# Patient Record
Sex: MALE | Race: WHITE | URBAN - METROPOLITAN AREA
[De-identification: names, ages, dates, MRNs, and addresses within clinical notes are randomized per-mention and may not be internally consistent; named-entity substitution may affect disease eponyms.]

---

## 2024-05-30 ENCOUNTER — APPOINTMENT (OUTPATIENT)
Dept: GENERAL RADIOLOGY | Age: 74
DRG: 378 | End: 2024-05-30
Attending: INTERNAL MEDICINE
Payer: MEDICARE

## 2024-05-30 ENCOUNTER — HOSPITAL ENCOUNTER (INPATIENT)
Age: 74
LOS: 6 days | Discharge: HOME | DRG: 378 | End: 2024-06-05
Attending: INTERNAL MEDICINE | Admitting: INTERNAL MEDICINE
Payer: MEDICARE

## 2024-05-30 PROBLEM — K92.2 GI BLEED: Status: ACTIVE | Noted: 2024-05-30

## 2024-05-30 LAB
ALBUMIN SERPL-MCNC: 3.2 G/DL (ref 3.5–5.2)
ALP SERPL-CCNC: 71 U/L (ref 40–129)
ALT SERPL-CCNC: 6 U/L (ref 0–40)
ANION GAP SERPL CALCULATED.3IONS-SCNC: 8 MMOL/L (ref 7–16)
AST SERPL-CCNC: 9 U/L (ref 0–39)
BILIRUB SERPL-MCNC: 0.5 MG/DL (ref 0–1.2)
BILIRUB UR QL STRIP: NEGATIVE
BUN SERPL-MCNC: 45 MG/DL (ref 6–23)
CALCIUM SERPL-MCNC: 8.4 MG/DL (ref 8.6–10.2)
CHLORIDE SERPL-SCNC: 111 MMOL/L (ref 98–107)
CLARITY UR: CLEAR
CO2 SERPL-SCNC: 25 MMOL/L (ref 22–29)
COLOR UR: YELLOW
COMMENT: NORMAL
CREAT SERPL-MCNC: 1 MG/DL (ref 0.7–1.2)
ERYTHROCYTE [DISTWIDTH] IN BLOOD BY AUTOMATED COUNT: 14.5 % (ref 11.5–15)
GFR, ESTIMATED: 76 ML/MIN/1.73M2
GLUCOSE SERPL-MCNC: 157 MG/DL (ref 74–99)
GLUCOSE UR STRIP-MCNC: NEGATIVE MG/DL
HCT VFR BLD AUTO: 24.6 % (ref 37–54)
HGB BLD-MCNC: 8 G/DL (ref 12.5–16.5)
HGB UR QL STRIP.AUTO: NEGATIVE
INR PPP: 1.3
KETONES UR STRIP-MCNC: NEGATIVE MG/DL
LEUKOCYTE ESTERASE UR QL STRIP: NEGATIVE
MAGNESIUM SERPL-MCNC: 2 MG/DL (ref 1.6–2.6)
MCH RBC QN AUTO: 28 PG (ref 26–35)
MCHC RBC AUTO-ENTMCNC: 32.5 G/DL (ref 32–34.5)
MCV RBC AUTO: 86 FL (ref 80–99.9)
NITRITE UR QL STRIP: NEGATIVE
PARTIAL THROMBOPLASTIN TIME: 23.4 SEC (ref 24.5–35.1)
PH UR STRIP: 5.5 [PH] (ref 5–9)
PHOSPHATE SERPL-MCNC: 3.1 MG/DL (ref 2.5–4.5)
PLATELET # BLD AUTO: 216 K/UL (ref 130–450)
PMV BLD AUTO: 10.5 FL (ref 7–12)
POTASSIUM SERPL-SCNC: 4.2 MMOL/L (ref 3.5–5)
PROT SERPL-MCNC: 5.5 G/DL (ref 6.4–8.3)
PROT UR STRIP-MCNC: NEGATIVE MG/DL
PROTHROMBIN TIME: 14.7 SEC (ref 9.3–12.4)
RBC # BLD AUTO: 2.86 M/UL (ref 3.8–5.8)
SODIUM SERPL-SCNC: 144 MMOL/L (ref 132–146)
SP GR UR STRIP: 1.02 (ref 1–1.03)
TROPONIN I SERPL HS-MCNC: 22 NG/L (ref 0–11)
UROBILINOGEN UR STRIP-ACNC: 0.2 EU/DL (ref 0–1)
WBC OTHER # BLD: 12.5 K/UL (ref 4.5–11.5)

## 2024-05-30 PROCEDURE — 6360000002 HC RX W HCPCS: Performed by: INTERNAL MEDICINE

## 2024-05-30 PROCEDURE — 2000000000 HC ICU R&B

## 2024-05-30 PROCEDURE — 83735 ASSAY OF MAGNESIUM: CPT

## 2024-05-30 PROCEDURE — 86850 RBC ANTIBODY SCREEN: CPT

## 2024-05-30 PROCEDURE — 85730 THROMBOPLASTIN TIME PARTIAL: CPT

## 2024-05-30 PROCEDURE — A4216 STERILE WATER/SALINE, 10 ML: HCPCS | Performed by: INTERNAL MEDICINE

## 2024-05-30 PROCEDURE — 71045 X-RAY EXAM CHEST 1 VIEW: CPT

## 2024-05-30 PROCEDURE — 86923 COMPATIBILITY TEST ELECTRIC: CPT

## 2024-05-30 PROCEDURE — 86901 BLOOD TYPING SEROLOGIC RH(D): CPT

## 2024-05-30 PROCEDURE — 2580000003 HC RX 258: Performed by: INTERNAL MEDICINE

## 2024-05-30 PROCEDURE — 80053 COMPREHEN METABOLIC PANEL: CPT

## 2024-05-30 PROCEDURE — 99291 CRITICAL CARE FIRST HOUR: CPT | Performed by: INTERNAL MEDICINE

## 2024-05-30 PROCEDURE — 85027 COMPLETE CBC AUTOMATED: CPT

## 2024-05-30 PROCEDURE — 84484 ASSAY OF TROPONIN QUANT: CPT

## 2024-05-30 PROCEDURE — 81003 URINALYSIS AUTO W/O SCOPE: CPT

## 2024-05-30 PROCEDURE — 84100 ASSAY OF PHOSPHORUS: CPT

## 2024-05-30 PROCEDURE — 86900 BLOOD TYPING SEROLOGIC ABO: CPT

## 2024-05-30 PROCEDURE — C9113 INJ PANTOPRAZOLE SODIUM, VIA: HCPCS | Performed by: INTERNAL MEDICINE

## 2024-05-30 PROCEDURE — 85610 PROTHROMBIN TIME: CPT

## 2024-05-30 RX ORDER — SERTRALINE HYDROCHLORIDE 25 MG/1
25 TABLET, FILM COATED ORAL DAILY
COMMUNITY

## 2024-05-30 RX ORDER — SODIUM CHLORIDE 0.9 % (FLUSH) 0.9 %
5-40 SYRINGE (ML) INJECTION PRN
Status: DISCONTINUED | OUTPATIENT
Start: 2024-05-30 | End: 2024-06-05 | Stop reason: HOSPADM

## 2024-05-30 RX ORDER — ONDANSETRON 4 MG/1
4 TABLET, ORALLY DISINTEGRATING ORAL EVERY 8 HOURS PRN
Status: DISCONTINUED | OUTPATIENT
Start: 2024-05-30 | End: 2024-06-05 | Stop reason: HOSPADM

## 2024-05-30 RX ORDER — SODIUM CHLORIDE, SODIUM LACTATE, POTASSIUM CHLORIDE, CALCIUM CHLORIDE 600; 310; 30; 20 MG/100ML; MG/100ML; MG/100ML; MG/100ML
INJECTION, SOLUTION INTRAVENOUS CONTINUOUS
Status: DISCONTINUED | OUTPATIENT
Start: 2024-05-30 | End: 2024-06-04

## 2024-05-30 RX ORDER — DONEPEZIL HYDROCHLORIDE 10 MG/1
10 TABLET, FILM COATED ORAL NIGHTLY
COMMUNITY

## 2024-05-30 RX ORDER — POTASSIUM CHLORIDE 7.45 MG/ML
10 INJECTION INTRAVENOUS PRN
Status: DISCONTINUED | OUTPATIENT
Start: 2024-05-30 | End: 2024-06-05 | Stop reason: HOSPADM

## 2024-05-30 RX ORDER — NALTREXONE HYDROCHLORIDE 50 MG/1
3 TABLET, FILM COATED ORAL DAILY
COMMUNITY

## 2024-05-30 RX ORDER — POLYETHYLENE GLYCOL 3350 17 G/17G
17 POWDER, FOR SOLUTION ORAL DAILY PRN
Status: DISCONTINUED | OUTPATIENT
Start: 2024-05-30 | End: 2024-06-05 | Stop reason: HOSPADM

## 2024-05-30 RX ORDER — ACETAMINOPHEN 325 MG/1
650 TABLET ORAL EVERY 6 HOURS PRN
Status: DISCONTINUED | OUTPATIENT
Start: 2024-05-30 | End: 2024-06-05 | Stop reason: HOSPADM

## 2024-05-30 RX ORDER — SODIUM CHLORIDE 9 MG/ML
INJECTION, SOLUTION INTRAVENOUS PRN
Status: DISCONTINUED | OUTPATIENT
Start: 2024-05-30 | End: 2024-06-05 | Stop reason: HOSPADM

## 2024-05-30 RX ORDER — POTASSIUM CHLORIDE 29.8 MG/ML
20 INJECTION INTRAVENOUS PRN
Status: DISCONTINUED | OUTPATIENT
Start: 2024-05-30 | End: 2024-06-05 | Stop reason: HOSPADM

## 2024-05-30 RX ORDER — SODIUM CHLORIDE 0.9 % (FLUSH) 0.9 %
5-40 SYRINGE (ML) INJECTION EVERY 12 HOURS SCHEDULED
Status: DISCONTINUED | OUTPATIENT
Start: 2024-05-30 | End: 2024-06-05 | Stop reason: HOSPADM

## 2024-05-30 RX ORDER — ACETAMINOPHEN 650 MG/1
650 SUPPOSITORY RECTAL EVERY 6 HOURS PRN
Status: DISCONTINUED | OUTPATIENT
Start: 2024-05-30 | End: 2024-06-05 | Stop reason: HOSPADM

## 2024-05-30 RX ORDER — MAGNESIUM SULFATE IN WATER 40 MG/ML
2000 INJECTION, SOLUTION INTRAVENOUS PRN
Status: DISCONTINUED | OUTPATIENT
Start: 2024-05-30 | End: 2024-06-05 | Stop reason: HOSPADM

## 2024-05-30 RX ORDER — ONDANSETRON 2 MG/ML
4 INJECTION INTRAMUSCULAR; INTRAVENOUS EVERY 6 HOURS PRN
Status: DISCONTINUED | OUTPATIENT
Start: 2024-05-30 | End: 2024-06-05 | Stop reason: HOSPADM

## 2024-05-30 RX ADMIN — SODIUM CHLORIDE, PRESERVATIVE FREE 40 MG: 5 INJECTION INTRAVENOUS at 11:36

## 2024-05-30 RX ADMIN — SODIUM CHLORIDE, POTASSIUM CHLORIDE, SODIUM LACTATE AND CALCIUM CHLORIDE: 600; 310; 30; 20 INJECTION, SOLUTION INTRAVENOUS at 21:40

## 2024-05-30 RX ADMIN — SODIUM CHLORIDE, POTASSIUM CHLORIDE, SODIUM LACTATE AND CALCIUM CHLORIDE: 600; 310; 30; 20 INJECTION, SOLUTION INTRAVENOUS at 11:40

## 2024-05-30 RX ADMIN — SODIUM CHLORIDE, PRESERVATIVE FREE 40 MG: 5 INJECTION INTRAVENOUS at 22:10

## 2024-05-30 RX ADMIN — SODIUM CHLORIDE, PRESERVATIVE FREE 10 ML: 5 INJECTION INTRAVENOUS at 11:41

## 2024-05-30 ASSESSMENT — PAIN SCALES - GENERAL: PAINLEVEL_OUTOF10: 0

## 2024-05-30 NOTE — CARE COORDINATION
Case Management Assessment  Initial Evaluation    Date/Time of Evaluation: 5/30/2024 3:31 PM  Assessment Completed by: Sharita Biswas RN    If patient is discharged prior to next notation, then this note serves as note for discharge by case management.    Patient Name: Faisal Pathak                   YOB: 1950  Diagnosis: GI bleed [K92.2]                   Date / Time: 5/30/2024 10:51 AM    Patient Admission Status: Inpatient   Readmission Risk (Low < 19, Mod (19-27), High > 27): Readmission Risk Score: 11.5    Current PCP: Joshua Vitale MD  PCP verified by CM? Yes    Chart Reviewed: Yes      History Provided by: Other (see comment) (sister in law)  Patient Orientation: Alert and Oriented, Other (see comment) (pt has Alzheimers , sister in law at bedside)    Patient Cognition: Dementia / Early Alzheimer's    Hospitalization in the last 30 days (Readmission):  No    If yes, Readmission Assessment in  Navigator will be completed.    Advance Directives:      Code Status: Full Code   Patient's Primary Decision Maker is: Legal Next of Kin    Primary Decision Maker: Daphne Norton - Child - [phone]    Primary Decision Maker: PathakAdama  Child - [phone]    Discharge Planning:    Patient lives with: Family Members, Children Type of Home: House  Primary Care Giver: Family  Patient Support Systems include: Children, Family Members, Friends/Neighbors     ADLS  Prior functional level: Assistance with the following:, Mobility, Cooking, Housework, Shopping  Current functional level: Assistance with the following:, Cooking, Housework, Shopping, Mobility      Family can provide assistance at DC: Yes  Would you like Case Management to discuss the discharge plan with any other family members/significant others, and if so, who? Yes (yes, Esperanza (ok per dtr Kelsey ))  Plans to Return to Present Housing: Yes  Other Identified Issues/Barriers to RETURNING to current housing: none

## 2024-05-30 NOTE — PROGRESS NOTES
4 Eyes Skin Assessment     NAME:  Faisal Pathak  YOB: 1950  MEDICAL RECORD NUMBER:  [id number]    The patient is being assessed for  Admission    I agree that at least one RN has performed a thorough Head to Toe Skin Assessment on the patient. ALL assessment sites listed below have been assessed.      Areas assessed by both nurses:    Head, Face, Ears, Shoulders, Back, Chest, Arms, Elbows, Hands, Sacrum. Buttock, Coccyx, Ischium, Legs. Feet and Heels, and Under Medical Devices     Sacrum/coccyx red and blanchable, heels red and flaky        Does the Patient have a Wound? No noted wound(s)       David Prevention initiated by RN: Yes  Wound Care Orders initiated by RN: No    Pressure Injury (Stage 3,4, Unstageable, DTI, NWPT, and Complex wounds) if present, place Wound referral order by RN under : No    New Ostomies, if present place, Ostomy referral order under : No     Nurse 1 eSignature: Electronically signed by Harriet Roth RN on 5/30/24 at 5:32 PM EDT    **SHARE this note so that the co-signing nurse can place an eSignature**    Nurse 2 eSignature: Electronically signed by Joshua Haider RN on 5/30/24 at 5:33 PM EDT

## 2024-05-30 NOTE — H&P
Dictate [id number]    Faisal was counseled on smoking cessation. Faisal smokes approximately 5-10 cigarettes per day x 40 years. We have had extensive discussion regarding the need to quit smoking, the negative health implications of smoking overall, as well as the impact on Faisal's comorbid conditions. Faisal does not voice a willingness to quit smoking and we have not set a quit date.  We have discussed potential methods for smoking cessation including nicotine replacement via patch, gum or lozenge, behavioral and lifestyle modifications, and follow-up with primary care provider for consideration of medications for smoking cessation as well.  We have also discussed additional resources such as CDC site for additional information, quitassist.com, and Quitline [place].  We have discussed the arrangement of follow-up with primary care provider to discuss progress as well as the potential institution of medications if required/desired.  The amount of time spent counseling the patient directly regarding smoking cessation is greater than 10 minutes.

## 2024-05-30 NOTE — ACP (ADVANCE CARE PLANNING)
Advance Care Planning   Healthcare Decision Maker:    Primary Decision Maker: Daphne Norton - Child - [phone]    Primary Decision Maker: Adama Pathak - Child - [phone]    Today we documented Decision Maker(s) consistent with Legal Next of Kin hierarchy.

## 2024-05-30 NOTE — CONSULTS
Assessment and Plan  Patient is a 73 y.o. male with the following medical Problems:   Upper GI bleeding  Epistaxis  Acute blood loss anemia  Prior history of upper GI bleeding 3 years ago.  History of duodenal ulcer.  Chronic atrial fibrillation on Eliquis  Heart failure with preserved ejection fraction  Coronary artery disease (status post CABG)  Spinal stenosis    Plan of care:  Basic labs including CBC, CMP, magnesium, phosphate, and INR.  Type and screen.  Transfuse to keep hemoglobin above 7.  Close monitoring of blood pressure.  GI consult.  Protonix twice a day.  Maintenance IV fluid.  N.p.o. while waiting for workup.    History of Present Illness:    70 yo male with Hx of Afib (on coumadin), CHF (EF 55-60%, 8/2019), CAD sp CABG, spinal stenosis with prior laminectomies and prior C6 corpectomy who presented with Right hand weakness, myelopathy. MRI showed significant cord compression; taken to OR on 9/13 and had a C3-7 laminectomy, C2-T1 instrumented fusion.   Patient was admitted to [facility] with upper GI bleeding however GI services were not available and patient was transferred to [facility] for management.    Past Medical History:  Past Medical History:   Diagnosis Date    Bleeding tendency (HCC)     Hand pain     Heart attack (HCC) 2004    Numbness       Past Surgical History:   Procedure Laterality Date    CERVICAL FUSION  2003    Dr. Joseph / [place]     CERVICAL FUSION  09/2019    multilevel posterior @ [facility]    CORONARY ARTERY BYPASS GRAFT  2004       Family History:   Family History   Problem Relation Age of Onset    Cancer Mother     Cancer Father     Heart Attack Father         Allergies:         Patient has no known allergies.    Social history:  Social History     Socioeconomic History    Marital status:      Spouse name: Not on file    Number of children: Not on file    Years of education: Not on file    Highest education level: Not on file  decision-making.  Time devoted to teaching and to any procedures I billed separately is not included.  Critical Care Time: 31 minutes      Electronically signed by Sesar Huffman MD on 5/30/2024 at 11:19 AM

## 2024-05-30 NOTE — PROGRESS NOTES
4 Eyes Skin Assessment     NAME:  Faisal Pathak  YOB: 1950  MEDICAL RECORD NUMBER:  [id number]    The patient is being assessed for  Admission    I agree that at least one RN has performed a thorough Head to Toe Skin Assessment on the patient. ALL assessment sites listed below have been assessed.      Areas assessed by both nurses:    Head, Face, Ears, Shoulders, Back, Chest, Arms, Elbows, Hands, Sacrum. Buttock, Coccyx, Ischium, Legs. Feet and Heels, and Under Medical Devices     Blanchable redness to gluteal crease/coccyx area, mepilex applied.  Heels red and dry.  Scattered bruising to BUE from frequent venipunctures.        Does the Patient have a Wound? No noted wound(s)       David Prevention initiated by RN: Yes  Wound Care Orders initiated by RN: No    Pressure Injury (Stage 3,4, Unstageable, DTI, NWPT, and Complex wounds) if present, place Wound referral order by RN under : No    New Ostomies, if present place, Ostomy referral order under : No     Nurse 1 eSignature: Electronically signed by Harriet Roth RN on 5/30/24 at 3:31 PM EDT    **SHARE this note so that the co-signing nurse can place an eSignature**    Nurse 2 eSignature: Electronically signed by Jada Lal RN on 5/30/24 at 3:33 PM EDT

## 2024-05-30 NOTE — PLAN OF CARE
Problem: Skin/Tissue Integrity  Goal: Absence of new skin breakdown  Description: 1.  Monitor for areas of redness and/or skin breakdown  2.  Assess vascular access sites hourly  3.  Every 4-6 hours minimum:  Change oxygen saturation probe site  4.  Every 4-6 hours:  If on nasal continuous positive airway pressure, respiratory therapy assess nares and determine need for appliance change or resting period.  Outcome: Not Progressing     Problem: Discharge Planning  Goal: Discharge to home or other facility with appropriate resources  Outcome: Progressing     Problem: ABCDS Injury Assessment  Goal: Absence of physical injury  Outcome: Progressing     Problem: Safety - Adult  Goal: Free from fall injury  Outcome: Progressing     Problem: Skin/Tissue Integrity  Goal: Absence of new skin breakdown  Description: 1.  Monitor for areas of redness and/or skin breakdown  2.  Assess vascular access sites hourly  3.  Every 4-6 hours minimum:  Change oxygen saturation probe site  4.  Every 4-6 hours:  If on nasal continuous positive airway pressure, respiratory therapy assess nares and determine need for appliance change or resting period.  Outcome: Not Progressing

## 2024-05-30 NOTE — H&P
[address] [address]                           HISTORY & PHYSICAL      PATIENT NAME: MIGEL RICHARDS            : 1950  MED REC NO: [id number]                        ROOM: [facility]  ACCOUNT NO: [id number]                       ADMIT DATE: 2024  PROVIDER: Mustapha Sims DO      REFERRING PHYSICIAN:  BERT LIMA    CHIEF COMPLAINT AND HISTORY OF CHIEF COMPLAINT:  This is a pleasant 73-year-old white male, who was transferred from [facility].  The patient normally sees his primary care doctor, Dr. Blaine Vitale.  The patient apparently presented to [facility] with what appeared to be epistaxis.  The patient had been on anticoagulant therapy because of paroxysmal atrial fibrillation.  The patient apparently developed epistaxis a day ago, which got progressively worse.  This has been uncontrolled bleeding, at which time he presented to the emergency room at [facility].  It was noted at that time the patient had significant amount of bleeding.  A Rhino rocket was placed into the left naris for the epistaxis.  It was also noted at that time the patient did appear to be hemodynamically stable with melena, melenic stools.  Because of the possibility of superimposed GI bleed without an ENT or GI specialist at [facility], the patient was transferred to the intensive coronary care unit here.  I was contacted by the access line.  At that time, in the emergency room, it was uncertain of his hemodynamic stable and he was admitted to the hospital here.  The patient arrived here by ambulance.  He appeared to be hemodynamically stable.  The patient had had consultation obtained with Critical Care and we reviewed this case.  We have seen the patient upon arrival.  From a cardiac standpoint of view, the patient does follow with Dr. St, which is Cardiology.  He does

## 2024-05-31 ENCOUNTER — ANESTHESIA (OUTPATIENT)
Dept: ENDOSCOPY | Age: 74
End: 2024-05-31
Payer: MEDICARE

## 2024-05-31 ENCOUNTER — ANESTHESIA EVENT (OUTPATIENT)
Dept: ENDOSCOPY | Age: 74
End: 2024-05-31
Payer: MEDICARE

## 2024-05-31 LAB
25(OH)D3 SERPL-MCNC: <6 NG/ML (ref 30–100)
ALBUMIN SERPL-MCNC: 3.1 G/DL (ref 3.5–5.2)
ALP SERPL-CCNC: 65 U/L (ref 40–129)
ALT SERPL-CCNC: 5 U/L (ref 0–40)
ANION GAP SERPL CALCULATED.3IONS-SCNC: 9 MMOL/L (ref 7–16)
AST SERPL-CCNC: 11 U/L (ref 0–39)
BASOPHILS # BLD: 0.04 K/UL (ref 0–0.2)
BASOPHILS NFR BLD: 0 % (ref 0–2)
BILIRUB SERPL-MCNC: 0.5 MG/DL (ref 0–1.2)
BUN SERPL-MCNC: 30 MG/DL (ref 6–23)
CALCIUM SERPL-MCNC: 8.4 MG/DL (ref 8.6–10.2)
CHLORIDE SERPL-SCNC: 108 MMOL/L (ref 98–107)
CHOLEST SERPL-MCNC: 107 MG/DL
CO2 SERPL-SCNC: 25 MMOL/L (ref 22–29)
CREAT SERPL-MCNC: 0.9 MG/DL (ref 0.7–1.2)
EOSINOPHIL # BLD: 0.07 K/UL (ref 0.05–0.5)
EOSINOPHILS RELATIVE PERCENT: 1 % (ref 0–6)
ERYTHROCYTE [DISTWIDTH] IN BLOOD BY AUTOMATED COUNT: 14.6 % (ref 11.5–15)
FOLATE SERPL-MCNC: 7.1 NG/ML (ref 4.8–24.2)
GFR, ESTIMATED: 87 ML/MIN/1.73M2
GLUCOSE SERPL-MCNC: 111 MG/DL (ref 74–99)
HCT VFR BLD AUTO: 20.2 % (ref 37–54)
HCT VFR BLD AUTO: 22.5 % (ref 37–54)
HCT VFR BLD AUTO: 22.6 % (ref 37–54)
HDLC SERPL-MCNC: 28 MG/DL
HGB BLD-MCNC: 6.6 G/DL (ref 12.5–16.5)
HGB BLD-MCNC: 7.3 G/DL (ref 12.5–16.5)
HGB BLD-MCNC: 7.4 G/DL (ref 12.5–16.5)
IMM GRANULOCYTES # BLD AUTO: 0.05 K/UL (ref 0–0.58)
IMM GRANULOCYTES NFR BLD: 0 % (ref 0–5)
INR PPP: 1.3
IRON SATN MFR SERPL: 8 % (ref 20–55)
IRON SERPL-MCNC: 21 UG/DL (ref 59–158)
LDLC SERPL CALC-MCNC: 54 MG/DL
LYMPHOCYTES NFR BLD: 2.9 K/UL (ref 1.5–4)
LYMPHOCYTES RELATIVE PERCENT: 23 % (ref 20–42)
MAGNESIUM SERPL-MCNC: 2 MG/DL (ref 1.6–2.6)
MCH RBC QN AUTO: 28.4 PG (ref 26–35)
MCHC RBC AUTO-ENTMCNC: 32.3 G/DL (ref 32–34.5)
MCV RBC AUTO: 87.9 FL (ref 80–99.9)
MONOCYTES NFR BLD: 1.02 K/UL (ref 0.1–0.95)
MONOCYTES NFR BLD: 8 % (ref 2–12)
NEUTROPHILS NFR BLD: 67 % (ref 43–80)
NEUTS SEG NFR BLD: 8.34 K/UL (ref 1.8–7.3)
PARTIAL THROMBOPLASTIN TIME: 21.8 SEC (ref 24.5–35.1)
PHOSPHATE SERPL-MCNC: 2.4 MG/DL (ref 2.5–4.5)
PLATELET # BLD AUTO: 203 K/UL (ref 130–450)
PMV BLD AUTO: 10.6 FL (ref 7–12)
POTASSIUM SERPL-SCNC: 3.9 MMOL/L (ref 3.5–5)
PROT SERPL-MCNC: 5.6 G/DL (ref 6.4–8.3)
PROTHROMBIN TIME: 14.2 SEC (ref 9.3–12.4)
RBC # BLD AUTO: 2.57 M/UL (ref 3.8–5.8)
SODIUM SERPL-SCNC: 142 MMOL/L (ref 132–146)
T4 FREE SERPL-MCNC: 1.3 NG/DL (ref 0.9–1.7)
TIBC SERPL-MCNC: 271 UG/DL (ref 250–450)
TRIGL SERPL-MCNC: 125 MG/DL
TROPONIN I SERPL HS-MCNC: 22 NG/L (ref 0–11)
TSH SERPL DL<=0.05 MIU/L-ACNC: 1.91 UIU/ML (ref 0.27–4.2)
VIT B12 SERPL-MCNC: 195 PG/ML (ref 211–946)
VLDLC SERPL CALC-MCNC: 25 MG/DL
WBC OTHER # BLD: 12.4 K/UL (ref 4.5–11.5)

## 2024-05-31 PROCEDURE — 6360000002 HC RX W HCPCS

## 2024-05-31 PROCEDURE — 0DJ08ZZ INSPECTION OF UPPER INTESTINAL TRACT, VIA NATURAL OR ARTIFICIAL OPENING ENDOSCOPIC: ICD-10-PCS | Performed by: INTERNAL MEDICINE

## 2024-05-31 PROCEDURE — 82607 VITAMIN B-12: CPT

## 2024-05-31 PROCEDURE — 84439 ASSAY OF FREE THYROXINE: CPT

## 2024-05-31 PROCEDURE — 6360000002 HC RX W HCPCS: Performed by: NURSE ANESTHETIST, CERTIFIED REGISTERED

## 2024-05-31 PROCEDURE — 84443 ASSAY THYROID STIM HORMONE: CPT

## 2024-05-31 PROCEDURE — 85018 HEMOGLOBIN: CPT

## 2024-05-31 PROCEDURE — 80053 COMPREHEN METABOLIC PANEL: CPT

## 2024-05-31 PROCEDURE — 82306 VITAMIN D 25 HYDROXY: CPT

## 2024-05-31 PROCEDURE — 99291 CRITICAL CARE FIRST HOUR: CPT | Performed by: INTERNAL MEDICINE

## 2024-05-31 PROCEDURE — 84100 ASSAY OF PHOSPHORUS: CPT

## 2024-05-31 PROCEDURE — 83735 ASSAY OF MAGNESIUM: CPT

## 2024-05-31 PROCEDURE — 36415 COLL VENOUS BLD VENIPUNCTURE: CPT

## 2024-05-31 PROCEDURE — 36592 COLLECT BLOOD FROM PICC: CPT

## 2024-05-31 PROCEDURE — 2580000003 HC RX 258

## 2024-05-31 PROCEDURE — 85025 COMPLETE CBC W/AUTO DIFF WBC: CPT

## 2024-05-31 PROCEDURE — P9016 RBC LEUKOCYTES REDUCED: HCPCS

## 2024-05-31 PROCEDURE — 2500000003 HC RX 250 WO HCPCS: Performed by: INTERNAL MEDICINE

## 2024-05-31 PROCEDURE — 6360000002 HC RX W HCPCS: Performed by: INTERNAL MEDICINE

## 2024-05-31 PROCEDURE — 6370000000 HC RX 637 (ALT 250 FOR IP)

## 2024-05-31 PROCEDURE — 36430 TRANSFUSION BLD/BLD COMPNT: CPT

## 2024-05-31 PROCEDURE — 2580000003 HC RX 258: Performed by: INTERNAL MEDICINE

## 2024-05-31 PROCEDURE — 84484 ASSAY OF TROPONIN QUANT: CPT

## 2024-05-31 PROCEDURE — C9113 INJ PANTOPRAZOLE SODIUM, VIA: HCPCS | Performed by: INTERNAL MEDICINE

## 2024-05-31 PROCEDURE — 7100000001 HC PACU RECOVERY - ADDTL 15 MIN

## 2024-05-31 PROCEDURE — 3700000001 HC ADD 15 MINUTES (ANESTHESIA): Performed by: INTERNAL MEDICINE

## 2024-05-31 PROCEDURE — 83540 ASSAY OF IRON: CPT

## 2024-05-31 PROCEDURE — 80061 LIPID PANEL: CPT

## 2024-05-31 PROCEDURE — 82746 ASSAY OF FOLIC ACID SERUM: CPT

## 2024-05-31 PROCEDURE — 93005 ELECTROCARDIOGRAM TRACING: CPT

## 2024-05-31 PROCEDURE — A4216 STERILE WATER/SALINE, 10 ML: HCPCS | Performed by: INTERNAL MEDICINE

## 2024-05-31 PROCEDURE — 2709999900 HC NON-CHARGEABLE SUPPLY: Performed by: INTERNAL MEDICINE

## 2024-05-31 PROCEDURE — 2000000000 HC ICU R&B

## 2024-05-31 PROCEDURE — 85610 PROTHROMBIN TIME: CPT

## 2024-05-31 PROCEDURE — 85014 HEMATOCRIT: CPT

## 2024-05-31 PROCEDURE — 2580000003 HC RX 258: Performed by: NURSE ANESTHETIST, CERTIFIED REGISTERED

## 2024-05-31 PROCEDURE — 85730 THROMBOPLASTIN TIME PARTIAL: CPT

## 2024-05-31 PROCEDURE — 3609017100 HC EGD: Performed by: INTERNAL MEDICINE

## 2024-05-31 PROCEDURE — 83550 IRON BINDING TEST: CPT

## 2024-05-31 PROCEDURE — 3700000000 HC ANESTHESIA ATTENDED CARE: Performed by: INTERNAL MEDICINE

## 2024-05-31 RX ORDER — DEXMEDETOMIDINE HYDROCHLORIDE 4 UG/ML
.1-1.5 INJECTION, SOLUTION INTRAVENOUS CONTINUOUS
Status: DISCONTINUED | OUTPATIENT
Start: 2024-05-31 | End: 2024-06-02

## 2024-05-31 RX ORDER — SODIUM CHLORIDE, SODIUM LACTATE, POTASSIUM CHLORIDE, CALCIUM CHLORIDE 600; 310; 30; 20 MG/100ML; MG/100ML; MG/100ML; MG/100ML
INJECTION, SOLUTION INTRAVENOUS CONTINUOUS PRN
Status: DISCONTINUED | OUTPATIENT
Start: 2024-05-31 | End: 2024-05-31 | Stop reason: SDUPTHER

## 2024-05-31 RX ORDER — SODIUM CHLORIDE 9 MG/ML
INJECTION, SOLUTION INTRAVENOUS PRN
Status: DISCONTINUED | OUTPATIENT
Start: 2024-05-31 | End: 2024-06-01

## 2024-05-31 RX ORDER — CYANOCOBALAMIN 1000 UG/ML
1000 INJECTION, SOLUTION INTRAMUSCULAR; SUBCUTANEOUS DAILY
Status: DISCONTINUED | OUTPATIENT
Start: 2024-05-31 | End: 2024-06-01

## 2024-05-31 RX ORDER — ERGOCALCIFEROL 1.25 MG/1
50000 CAPSULE ORAL WEEKLY
Status: DISCONTINUED | OUTPATIENT
Start: 2024-05-31 | End: 2024-06-05 | Stop reason: HOSPADM

## 2024-05-31 RX ORDER — POTASSIUM CHLORIDE 7.45 MG/ML
10 INJECTION INTRAVENOUS
Status: COMPLETED | OUTPATIENT
Start: 2024-05-31 | End: 2024-05-31

## 2024-05-31 RX ORDER — PROPOFOL 10 MG/ML
INJECTION, EMULSION INTRAVENOUS PRN
Status: DISCONTINUED | OUTPATIENT
Start: 2024-05-31 | End: 2024-05-31 | Stop reason: SDUPTHER

## 2024-05-31 RX ADMIN — PROPOFOL 50 MG: 10 INJECTION, EMULSION INTRAVENOUS at 17:00

## 2024-05-31 RX ADMIN — SODIUM CHLORIDE, PRESERVATIVE FREE 10 ML: 5 INJECTION INTRAVENOUS at 19:21

## 2024-05-31 RX ADMIN — SODIUM CHLORIDE, PRESERVATIVE FREE 10 ML: 5 INJECTION INTRAVENOUS at 09:40

## 2024-05-31 RX ADMIN — SODIUM CHLORIDE, POTASSIUM CHLORIDE, SODIUM LACTATE AND CALCIUM CHLORIDE: 600; 310; 30; 20 INJECTION, SOLUTION INTRAVENOUS at 16:57

## 2024-05-31 RX ADMIN — ERGOCALCIFEROL 50000 UNITS: 1.25 CAPSULE ORAL at 13:38

## 2024-05-31 RX ADMIN — SODIUM CHLORIDE 100 MG: 9 INJECTION, SOLUTION INTRAVENOUS at 19:19

## 2024-05-31 RX ADMIN — CYANOCOBALAMIN 1000 MCG: 1000 INJECTION, SOLUTION INTRAMUSCULAR; SUBCUTANEOUS at 15:21

## 2024-05-31 RX ADMIN — POTASSIUM CHLORIDE 10 MEQ: 7.46 INJECTION, SOLUTION INTRAVENOUS at 05:36

## 2024-05-31 RX ADMIN — PROPOFOL 50 MG: 10 INJECTION, EMULSION INTRAVENOUS at 16:59

## 2024-05-31 RX ADMIN — THIAMINE HYDROCHLORIDE 100 MG: 100 INJECTION, SOLUTION INTRAMUSCULAR; INTRAVENOUS at 14:25

## 2024-05-31 RX ADMIN — SODIUM CHLORIDE, PRESERVATIVE FREE 40 MG: 5 INJECTION INTRAVENOUS at 10:18

## 2024-05-31 RX ADMIN — Medication 0.1 MCG/KG/HR: at 11:57

## 2024-05-31 RX ADMIN — POTASSIUM CHLORIDE 10 MEQ: 7.46 INJECTION, SOLUTION INTRAVENOUS at 06:27

## 2024-05-31 RX ADMIN — SODIUM CHLORIDE 25 MG: 9 INJECTION, SOLUTION INTRAVENOUS at 15:23

## 2024-05-31 RX ADMIN — SODIUM CHLORIDE, POTASSIUM CHLORIDE, SODIUM LACTATE AND CALCIUM CHLORIDE: 600; 310; 30; 20 INJECTION, SOLUTION INTRAVENOUS at 19:38

## 2024-05-31 RX ADMIN — FOLIC ACID 1 MG: 5 INJECTION, SOLUTION INTRAMUSCULAR; INTRAVENOUS; SUBCUTANEOUS at 13:37

## 2024-05-31 RX ADMIN — SODIUM CHLORIDE, POTASSIUM CHLORIDE, SODIUM LACTATE AND CALCIUM CHLORIDE: 600; 310; 30; 20 INJECTION, SOLUTION INTRAVENOUS at 08:01

## 2024-05-31 ASSESSMENT — PAIN SCALES - GENERAL
PAINLEVEL_OUTOF10: 0

## 2024-05-31 ASSESSMENT — LIFESTYLE VARIABLES: SMOKING_STATUS: 1

## 2024-05-31 ASSESSMENT — COPD QUESTIONNAIRES: CAT_SEVERITY: MODERATE

## 2024-05-31 NOTE — PROGRESS NOTES
Call Made to Dr Massey regarding Hemoglobin of 6.6. And plan of care.     Awaiting a call back.     Electronically signed by Jada Lal RN on 5/31/2024 at 2:37 PM

## 2024-05-31 NOTE — PROGRESS NOTES
Internal Medicine Consult Note    [facility]    Mustapha Sims D.O., JEOTonyI.                    Doug Keller D.O., WENDYI.                             Devyn Hemphill D.O.     Anabella Burgess, MSN, APRN, NP-C  Kevin Perez, MSN, APRN-CNP  Kj Carreon, MSN, APRN-CNP  Rosenda Ruiz, MSN APRN-CNP  Parul Hill, MSN. APRN-NP-C     Primary Care Physician: Joshua Vitale MD   Admitting Physician:  Mustapha Sims DO  Admission date and time: 5/30/2024 10:51 AM    Room:  [place] [address]  Admitting diagnosis: GI bleed [K92.2]    Patient Name: Faisal Pathak  MRN: [id number]    Date of Service: 5/31/2024     Subjective:  Faisal is a 73 y.o. male who was seen and examined today,5/31/2024, at the bedside.  Patient resting comfortably at the present time but was restless throughout the night.  Patient does have a history of underlying dementia and only alert x 1.  Does experience occasional hallucinations and transient confusion.  Patient resting comfortably without oxygen patient is hemodynamically stable    No family present during my examination.    Review of System: Limited at the present time due to confusion  Constitutional:   Denies fever or chills, weight loss or gain, fatigue or malaise.  HEENT:   Denies ear pain, sore throat, sinus or eye problems.  Cardiovascular:   Denies any chest pain, irregular heartbeats, or palpitations.   Respiratory:   Denies shortness of breath, coughing, sputum production, hemoptysis, or wheezing.  Gastrointestinal:   Denies nausea, vomiting, diarrhea, or constipation.  Denies any abdominal pain.  Genitourinary:    Denies any urgency, frequency, hematuria. Voiding  without difficulty.  Extremities:   Denies lower extremity swelling, edema or cyanosis.   Neurology:    Denies any headache or focal neurological deficits, Denies generalized weakness or memory difficulty.   Psch:   Denies being anxious or depressed.  Musculoskeletal:    Denies   Rocket  Precedex for agitation  Strong consideration for upper GI panendoscopy.  Dr. Massey has been consulted  Slight troponin elevation demand ischemia  IV iron replacement      Greater than 40 minutes of critical care time was spent with the patient. This includes chart review, , and discussion with those consultants involved in the patient's care.       More than 50% of my  time was spent at the bedside counseling/coordinating care with the patient and/or family with face to face contact.  This time was spent reviewing notes and laboratory data as well as instructing and counseling the patient. Time I spent with the family or surrogate(s) is included only if the patient was incapable of providing the necessary information or participating in medical decisions. I also discussed the differential diagnosis and all of the proposed management plans with the patient and individuals accompanying the patient.    Mustapha Sims DO, F.A.C.O.I.  5/31/2024  1:15 PM

## 2024-05-31 NOTE — PROGRESS NOTES
ENT notified of Rhino rocket removal  No further plans for intervention unless new onset of bleeding.     Electronically signed by Jada Lal RN on 5/31/2024 at 4:34 PM

## 2024-05-31 NOTE — ANESTHESIA PRE PROCEDURE
BP Readings from Last 3 Encounters:   05/31/24 100/65   06/30/21 (!) 140/89   02/27/20 (!) 154/96       NPO Status:                                                                                 BMI:   Wt Readings from Last 3 Encounters:   05/31/24 105.5 kg (232 lb 9.6 oz)   06/30/21 122.5 kg (270 lb)   02/27/20 115.7 kg (255 lb)     Body mass index is 29.86 kg/m².    CBC:   Lab Results   Component Value Date/Time    WBC 12.4 05/31/2024 03:31 AM    RBC 2.57 05/31/2024 03:31 AM    HGB 6.6 05/31/2024 01:11 PM    HCT 20.2 05/31/2024 01:11 PM    MCV 87.9 05/31/2024 03:31 AM    RDW 14.6 05/31/2024 03:31 AM     05/31/2024 03:31 AM       CMP:   Lab Results   Component Value Date/Time     05/31/2024 03:31 AM    K 3.9 05/31/2024 03:31 AM     05/31/2024 03:31 AM    CO2 25 05/31/2024 03:31 AM    BUN 30 05/31/2024 03:31 AM    CREATININE 0.9 05/31/2024 03:31 AM    LABGLOM 87 05/31/2024 03:31 AM    GLUCOSE 111 05/31/2024 03:31 AM    CALCIUM 8.4 05/31/2024 03:31 AM    BILITOT 0.5 05/31/2024 03:31 AM    ALKPHOS 65 05/31/2024 03:31 AM    AST 11 05/31/2024 03:31 AM    ALT 5 05/31/2024 03:31 AM       POC Tests: No results for input(s): \"POCGLU\", \"POCNA\", \"POCK\", \"POCCL\", \"POCBUN\", \"POCHEMO\", \"POCHCT\" in the last 72 hours.    Coags:   Lab Results   Component Value Date/Time    PROTIME 14.2 05/31/2024 03:31 AM    INR 1.3 05/31/2024 03:31 AM    APTT 21.8 05/31/2024 03:31 AM       HCG (If Applicable): No results found for: \"PREGTESTUR\", \"PREGSERUM\", \"HCG\", \"HCGQUANT\"     ABGs: No results found for: \"PHART\", \"PO2ART\", \"QOQ0NMT\", \"QMQ4EEN\", \"BEART\", \"M1PFAFBF\"     Type & Screen (If Applicable):  No results found for: \"LABABO\"    Drug/Infectious Status (If Applicable):  No results found for: \"HIV\", \"HEPCAB\"    COVID-19 Screening (If Applicable): No results found for: \"COVID19\"        Anesthesia Evaluation  Patient summary reviewed  Airway: Mallampati: III  TM distance: >3 FB   Neck ROM:

## 2024-05-31 NOTE — CONSULTS
Consult received for rhino rocket removal. Pt had rhino rocket placed on what sounds like 5/29 or 5/30. Discussed with nurse who denies any active bleeding at this time. Rhino rocket to be removed in 5-7 days. Will plan to remove 6/4 or 6/5 if patient still in house. If patient discharges prior he can follow up with Dr. Escamilla for removal.    Patient seen and examined by resident, discussed with attending on call.    Jose Andrade DO,  Resident Physician, PGY-3  Department of Otolaryngology, [facility]

## 2024-05-31 NOTE — CONSULTS
Assessment and Plan  Patient is a 73 y.o. male with the following medical Problems:   Upper GI bleeding  Epistaxis  Acute blood loss anemia  Prior history of upper GI bleeding 3 years ago.  History of duodenal ulcer.  Chronic atrial fibrillation on Eliquis  Heart failure with preserved ejection fraction  Coronary artery disease (status post CABG)  Spinal stenosis  History of Alzheimer's dementia  Delirium    Plan of care:  Transfuse to keep hemoglobin above 7.  Close monitoring of blood pressure.  H&H every 12 hours.  GI consult.  Protonix twice a day.  Maintenance IV fluid.  Precedex for agitation  Thiamine and folic acid    History of Present Illness:    70 yo male with Hx of Afib (on coumadin), CHF (EF 55-60%, 8/2019), CAD sp CABG, spinal stenosis with prior laminectomies and prior C6 corpectomy who presented with Right hand weakness, myelopathy. MRI showed significant cord compression; taken to OR on 9/13 and had a C3-7 laminectomy, C2-T1 instrumented fusion.   Patient was admitted to [facility] with upper GI bleeding however GI services were not available and patient was transferred to [facility] for management.    Daily progress:  May 31, 2024: Patient has been confused and delirious overnight.  He was started on Precedex this morning.  Nasal packing was removed.  Hemoglobin continues to trend down.  Patient had melenic bowel movements yesterday.      Past Medical History:  Past Medical History:   Diagnosis Date    Bleeding tendency (HCC)     Hand pain     Heart attack (HCC) 2004    Numbness       Past Surgical History:   Procedure Laterality Date    CERVICAL FUSION  2003    Dr. Joseph / [place]     CERVICAL FUSION  09/2019    multilevel posterior @ [facility]    CORONARY ARTERY BYPASS GRAFT  2004       Family History:   Family History   Problem Relation Age of Onset    Cancer Mother     Cancer Father     Heart Attack Father         Allergies:         Patient has no known  Urinary retention (Axillary)   Resp 21   Ht 1.88 m (6' 2\")   Wt 105.5 kg (232 lb 9.6 oz)   SpO2 100%   BMI 29.86 kg/m²     Input/Output:  In: 1906.7 [P.O.:100; I.V.:1796.6]  Out: 1350     Oxygen requirements: RA    Ventilator Information:       General appearance: Ill looking,Pale, not in pain or distress, in no respiratory distress    HEENT: Atraumatic/normocephalic, EOMI, DAVID, pharynx clear, moist mucosa, redness of the uvula appreciated,   Neck: Supple, no jugular venous distension, lymphadenopathy, thyromegaly or carotid bruits  Chest: Decreased  breath sounds, no wheezing, no crackles and no tenderness over ribs   Cardiovascular: Normal S1 , S2, regular rate and rhythm, no murmur, rub or gallop  Abdomen: Normal sounds present, soft, lax with no tenderness, no hepatosplenomegaly, and no masses  Extremities: No edema. Pulses are equally present.   Skin: intact, no rashes   Neurologic: Alert and oriented x 1-2, No focal deficit     Investigations:  Labs, radiological imaging and cardiac work up were personally reviewed        ICU STAFF PHYSICIAN NOTE OF PERSONAL INVOLVEMENT IN CARE  As the attending physician, I certify that I personally reviewed the patient's history and personally examined the patient to confirm the physical findings described above, and that I reviewed the relevant imaging studies and available reports.  I also discussed the differential diagnosis and all of the proposed management plans with the patient and individuals accompanying the patient to this visit.  They had the opportunity to ask questions about the proposed management plans and to have those questions answered.    This patient has a high probability of sudden, clinically significant deterioration, which requires the highest level of physician preparedness to intervene urgently.  I managed/supervised life or organ supporting interventions that required frequent physician assessment.  I devoted my full attention to the direct care of this

## 2024-05-31 NOTE — ANESTHESIA POSTPROCEDURE EVALUATION
Department of Anesthesiology  Postprocedure Note    Patient: Faisal Pathak  MRN: [id number]  YOB: 1950  Date of evaluation: 5/31/2024    Procedure Summary       Date: 05/31/24 Room / Location: [place] [address] / [facility]    Anesthesia Start: 1657 Anesthesia Stop: 1707    Procedure: ESOPHAGOGASTRODUODENOSCOPY Diagnosis:       Gastrointestinal hemorrhage, unspecified gastrointestinal hemorrhage type      (Gastrointestinal hemorrhage, unspecified gastrointestinal hemorrhage type [K92.2])    Surgeons: Tucker Sewell MD Responsible Provider: Sumeet Briones MD    Anesthesia Type: MAC ASA Status: 3 - Emergent            Anesthesia Type: MAC    Juan Phase I:      Ujan Phase II:      Anesthesia Post Evaluation    Patient location during evaluation: ICU  Patient participation: complete - patient participated  Level of consciousness: awake  Pain score: 0  Airway patency: patent  Nausea & Vomiting: no vomiting and no nausea  Cardiovascular status: blood pressure returned to baseline and hemodynamically stable  Respiratory status: acceptable  Hydration status: stable  Pain management: adequate        No notable events documented.

## 2024-05-31 NOTE — PLAN OF CARE
Problem: Discharge Planning  Goal: Discharge to home or other facility with appropriate resources  5/30/2024 2016 by Shama Lopez, RN  Outcome: Progressing     Problem: ABCDS Injury Assessment  Goal: Absence of physical injury  5/30/2024 2016 by Shama Lopez, RN  Outcome: Progressing     Problem: Safety - Adult  Goal: Free from fall injury  5/30/2024 2016 by Shama Lopez, RN  Outcome: Progressing     Problem: Skin/Tissue Integrity  Goal: Absence of new skin breakdown  Description: 1.  Monitor for areas of redness and/or skin breakdown  2.  Assess vascular access sites hourly  3.  Every 4-6 hours minimum:  Change oxygen saturation probe site  4.  Every 4-6 hours:  If on nasal continuous positive airway pressure, respiratory therapy assess nares and determine need for appliance change or resting period.  5/30/2024 2016 by Shama Lopez, RN  Outcome: Progressing

## 2024-05-31 NOTE — PLAN OF CARE
Problem: Discharge Planning  Goal: Discharge to home or other facility with appropriate resources  Outcome: Not Progressing     Problem: Neurosensory - Adult  Goal: Achieves maximal functionality and self care  Outcome: Not Progressing     Problem: Musculoskeletal - Adult  Goal: Return mobility to safest level of function  Outcome: Not Progressing     Problem: Gastrointestinal - Adult  Goal: Minimal or absence of nausea and vomiting  Outcome: Not Progressing     Problem: Hematologic - Adult  Goal: Maintains hematologic stability  Outcome: Not Progressing     Problem: Anxiety  Goal: Will report anxiety at manageable levels  Description: INTERVENTIONS:  1. Administer medication as ordered  2. Teach and rehearse alternative coping skills  3. Provide emotional support with 1:1 interaction with staff  Outcome: Not Progressing     Problem: Skin/Tissue Integrity  Goal: Absence of new skin breakdown  Description: 1.  Monitor for areas of redness and/or skin breakdown  2.  Assess vascular access sites hourly  3.  Every 4-6 hours minimum:  Change oxygen saturation probe site  4.  Every 4-6 hours:  If on nasal continuous positive airway pressure, respiratory therapy assess nares and determine need for appliance change or resting period.  Outcome: Progressing     Problem: ABCDS Injury Assessment  Goal: Absence of physical injury  Outcome: Progressing     Problem: Safety - Adult  Goal: Free from fall injury  Outcome: Progressing     Problem: Respiratory - Adult  Goal: Achieves optimal ventilation and oxygenation  Outcome: Progressing     Problem: Cardiovascular - Adult  Goal: Maintains optimal cardiac output and hemodynamic stability  Outcome: Progressing     Problem: Cardiovascular - Adult  Goal: Absence of cardiac dysrhythmias or at baseline  Outcome: Progressing     Problem: Skin/Tissue Integrity - Adult  Goal: Skin integrity remains intact  Outcome: Progressing     Problem: Metabolic/Fluid and Electrolytes - Adult  Goal:

## 2024-05-31 NOTE — CONSULTS
[place] [id number]                              CONSULTATION      PATIENT NAME: MIGEL RICHARDS            : 1950  MED REC NO: [id number]                        ROOM: [facility]  ACCOUNT NO: [id number]                       ADMIT DATE: 2024  PROVIDER: Jermaine Massey MD      CONSULT DATE:  2024    REFERRING PHYSICIAN:  BERT LIMA    CHIEF COMPLAINT:  Anemia.    HISTORY OF PRESENT ILLNESS:  73-year-old man admitted to the hospital earlier today for anemia.    The patient was in his usual state of health until 2 days ago when, in the morning, he began having severe nosebleed.  The nosebleed persisted and his wife was unable to stop it.  He was on Eliquis, for atrial fibrillation.  She took him to the [facility], where he was admitted.  His nose was packed and eventually the bleeding stopped.  However, the nosebleed had gone on for a long time and the patient became anemic.  He had melena.  The patient was then transferred to [facility].  He is in ICU.  He has not bled since admission.  His hemoglobin is 8 g/dL.  Eliquis has been held.  His creatinine is 1.0, his BUN is 45 mg/dL.  White cell count is 12,500, the platelet count 216,000.  He is currently lying comfortably in bed.    PAST MEDICAL HISTORY:  Coronary artery disease, myocardial infarction in .    PAST SURGICAL HISTORY:  Two cervical fusions, coronary artery bypass graft surgery.    REVIEW OF SYSTEMS:  Unremarkable.  No cardiac, respiratory, urinary, musculoskeletal, cutaneous, or neurologic complaints.    SOCIAL HISTORY:  Smokes daily.  Rarely drinks alcohol.  Never used recreational drugs.    ALLERGIES:  NONE KNOWN.      MEDICATIONS:  Tylenol, Zofran, Protonix, GlycoLax, magnesium sulfate, potassium chloride.    PHYSICAL EXAMINATION:  GENERAL:  Well-nourished, well-developed 73-year-old man, in no acute distress.  HEENT:

## 2024-05-31 NOTE — PROGRESS NOTES
SBAR form completed. Placed on chart. Chart with patient in transit to ICU. Nurse to Nurse report given.

## 2024-05-31 NOTE — CONSULTS
Consult Note    Reason for Consult:  Chronic active fibrillation.  Anemia.    Requesting Physician:  Mustapha Sims DO    HISTORY OF PRESENT ILLNESS:    The patient is a 73 y.o. male who I saw in the past.    He has problem with coronary artery disease with CABG surgery done more than 15 years ago.    He has problem with sick sinus syndrome requiring insertion of dual-chamber permanent pacemaker in the past.    He was diagnosed with recurrent atrial fibrillation  few years ago requiring multiple cardioversions.  He failed his last cardioversion. I talked to the patient at that time about all other options including ablation procedure but he said that he was living with his at her for ablation and the plan was just heart rate control and long-term anticoagulation therapy.    He apparently developed some dementia a few months ago.    He presented to [facility] 3 days ago with nosebleed.  The family thought patient had also tarry black stool.  Patient underwent packing of his left nostril by the ER physician.    There was concern about GI bleeding.  He dropped his hemoglobin significantly while in the hospital.  There was no gastroenterologist available at [facility] to take care of the patient.    Patient ended coming to [facility] ICU.    Cardiac consult was requested    Patient was then flattened bed and I came to see him.  He was pleasantly confused.  He did not appear in acute distress.        Past Medical History:   Diagnosis Date    Bleeding tendency (HCC)     Hand pain     Heart attack (HCC) 2004    Numbness        Past Surgical History:   Procedure Laterality Date    CERVICAL FUSION  2003    Dr. Joseph / [place]     CERVICAL FUSION  09/2019    multilevel posterior @ [facility]    CORONARY ARTERY BYPASS GRAFT  2004       Prior to Admission medications    Medication Sig Start Date End Date Taking? Authorizing Provider   apixaban (ELIQUIS) 5 MG TABS tablet Take 1 tablet by  ondansetron, polyethylene glycol, acetaminophen **OR** acetaminophen    No Known Allergies    Social History     Socioeconomic History    Marital status:      Spouse name: Not on file    Number of children: Not on file    Years of education: Not on file    Highest education level: Not on file   Occupational History    Not on file   Tobacco Use    Smoking status: Every Day    Smokeless tobacco: Never   Vaping Use    Vaping Use: Never used   Substance and Sexual Activity    Alcohol use: Yes     Comment: rarely    Drug use: Never    Sexual activity: Not on file   Other Topics Concern    Not on file   Social History Narrative    Not on file     Social Determinants of Health     Financial Resource Strain: Not on file   Food Insecurity: No Food Insecurity (5/30/2024)    Hunger Vital Sign     Worried About Running Out of Food in the Last Year: Never true     Ran Out of Food in the Last Year: Never true   Transportation Needs: No Transportation Needs (5/30/2024)    PRAPARE - Transportation     Lack of Transportation (Medical): No     Lack of Transportation (Non-Medical): No   Physical Activity: Not on file   Stress: Not on file   Social Connections: Not on file   Intimate Partner Violence: Not on file   Housing Stability: Low Risk  (5/30/2024)    Housing Stability Vital Sign     Unable to Pay for Housing in the Last Year: No     Number of Places Lived in the Last Year: 1     Unstable Housing in the Last Year: No       Family History   Problem Relation Age of Onset    Cancer Mother     Cancer Father     Heart Attack Father        Review Of Systems:   I cannot get a review of systems from the patient due to his mental status.  Physical Exam:    General Appearance:  Alert, cooperative, no distress, appears stated age  Head:  Normocephalic, without obvious abnormality, atraumatic  HEENT: Fairly unremarkable findings.    Neck: Supple, no JVD  Lungs: Mildly diminished breath sounds in the bases, no wheezing  Chest Wall: No

## 2024-06-01 PROBLEM — R04.0 EPISTAXIS: Status: ACTIVE | Noted: 2024-06-01

## 2024-06-01 LAB
ABO/RH: NORMAL
ALBUMIN SERPL-MCNC: 3 G/DL (ref 3.5–5.2)
ALP SERPL-CCNC: 71 U/L (ref 40–129)
ALT SERPL-CCNC: 7 U/L (ref 0–40)
ANION GAP SERPL CALCULATED.3IONS-SCNC: 6 MMOL/L (ref 7–16)
ANTIBODY SCREEN: NEGATIVE
ARM BAND NUMBER: NORMAL
AST SERPL-CCNC: 13 U/L (ref 0–39)
BASOPHILS # BLD: 0.04 K/UL (ref 0–0.2)
BASOPHILS NFR BLD: 1 % (ref 0–2)
BILIRUB SERPL-MCNC: 0.7 MG/DL (ref 0–1.2)
BLOOD BANK BLOOD PRODUCT EXPIRATION DATE: NORMAL
BLOOD BANK DISPENSE STATUS: NORMAL
BLOOD BANK ISBT PRODUCT BLOOD TYPE: 600
BLOOD BANK PRODUCT CODE: NORMAL
BLOOD BANK SAMPLE EXPIRATION: NORMAL
BLOOD BANK UNIT TYPE AND RH: NORMAL
BPU ID: NORMAL
BUN SERPL-MCNC: 18 MG/DL (ref 6–23)
CALCIUM SERPL-MCNC: 8.5 MG/DL (ref 8.6–10.2)
CHLORIDE SERPL-SCNC: 105 MMOL/L (ref 98–107)
CO2 SERPL-SCNC: 27 MMOL/L (ref 22–29)
COMPONENT: NORMAL
CREAT SERPL-MCNC: 0.8 MG/DL (ref 0.7–1.2)
CROSSMATCH RESULT: NORMAL
EOSINOPHIL # BLD: 0.09 K/UL (ref 0.05–0.5)
EOSINOPHILS RELATIVE PERCENT: 1 % (ref 0–6)
ERYTHROCYTE [DISTWIDTH] IN BLOOD BY AUTOMATED COUNT: 14.9 % (ref 11.5–15)
GFR, ESTIMATED: >90 ML/MIN/1.73M2
GLUCOSE BLD-MCNC: 101 MG/DL (ref 74–99)
GLUCOSE SERPL-MCNC: 93 MG/DL (ref 74–99)
HCT VFR BLD AUTO: 24.5 % (ref 37–54)
HCT VFR BLD AUTO: 25 % (ref 37–54)
HCT VFR BLD AUTO: 27.1 % (ref 37–54)
HGB BLD-MCNC: 7.9 G/DL (ref 12.5–16.5)
HGB BLD-MCNC: 8.3 G/DL (ref 12.5–16.5)
HGB BLD-MCNC: 8.8 G/DL (ref 12.5–16.5)
IMM GRANULOCYTES # BLD AUTO: 0.03 K/UL (ref 0–0.58)
IMM GRANULOCYTES NFR BLD: 0 % (ref 0–5)
INR PPP: 1.2
LYMPHOCYTES NFR BLD: 1.75 K/UL (ref 1.5–4)
LYMPHOCYTES RELATIVE PERCENT: 22 % (ref 20–42)
MAGNESIUM SERPL-MCNC: 2 MG/DL (ref 1.6–2.6)
MCH RBC QN AUTO: 28.9 PG (ref 26–35)
MCHC RBC AUTO-ENTMCNC: 33.2 G/DL (ref 32–34.5)
MCV RBC AUTO: 87.1 FL (ref 80–99.9)
MONOCYTES NFR BLD: 0.66 K/UL (ref 0.1–0.95)
MONOCYTES NFR BLD: 8 % (ref 2–12)
NEUTROPHILS NFR BLD: 67 % (ref 43–80)
NEUTS SEG NFR BLD: 5.32 K/UL (ref 1.8–7.3)
PARTIAL THROMBOPLASTIN TIME: 27.3 SEC (ref 24.5–35.1)
PHOSPHATE SERPL-MCNC: 2.6 MG/DL (ref 2.5–4.5)
PLATELET # BLD AUTO: 193 K/UL (ref 130–450)
PMV BLD AUTO: 10.4 FL (ref 7–12)
POTASSIUM SERPL-SCNC: 4 MMOL/L (ref 3.5–5)
PROT SERPL-MCNC: 5.5 G/DL (ref 6.4–8.3)
PROTHROMBIN TIME: 13.6 SEC (ref 9.3–12.4)
RBC # BLD AUTO: 2.87 M/UL (ref 3.8–5.8)
SODIUM SERPL-SCNC: 138 MMOL/L (ref 132–146)
TRANSFUSION STATUS: NORMAL
UNIT DIVISION: 0
UNIT ISSUE DATE/TIME: NORMAL
WBC OTHER # BLD: 7.9 K/UL (ref 4.5–11.5)

## 2024-06-01 PROCEDURE — 6370000000 HC RX 637 (ALT 250 FOR IP): Performed by: STUDENT IN AN ORGANIZED HEALTH CARE EDUCATION/TRAINING PROGRAM

## 2024-06-01 PROCEDURE — 85018 HEMOGLOBIN: CPT

## 2024-06-01 PROCEDURE — 99291 CRITICAL CARE FIRST HOUR: CPT | Performed by: INTERNAL MEDICINE

## 2024-06-01 PROCEDURE — 6360000002 HC RX W HCPCS: Performed by: INTERNAL MEDICINE

## 2024-06-01 PROCEDURE — 84100 ASSAY OF PHOSPHORUS: CPT

## 2024-06-01 PROCEDURE — 2580000003 HC RX 258

## 2024-06-01 PROCEDURE — 2500000003 HC RX 250 WO HCPCS: Performed by: INTERNAL MEDICINE

## 2024-06-01 PROCEDURE — 85730 THROMBOPLASTIN TIME PARTIAL: CPT

## 2024-06-01 PROCEDURE — 83735 ASSAY OF MAGNESIUM: CPT

## 2024-06-01 PROCEDURE — 85025 COMPLETE CBC W/AUTO DIFF WBC: CPT

## 2024-06-01 PROCEDURE — 36415 COLL VENOUS BLD VENIPUNCTURE: CPT

## 2024-06-01 PROCEDURE — A4216 STERILE WATER/SALINE, 10 ML: HCPCS | Performed by: INTERNAL MEDICINE

## 2024-06-01 PROCEDURE — 80053 COMPREHEN METABOLIC PANEL: CPT

## 2024-06-01 PROCEDURE — 82962 GLUCOSE BLOOD TEST: CPT

## 2024-06-01 PROCEDURE — 2580000003 HC RX 258: Performed by: INTERNAL MEDICINE

## 2024-06-01 PROCEDURE — 85610 PROTHROMBIN TIME: CPT

## 2024-06-01 PROCEDURE — C9113 INJ PANTOPRAZOLE SODIUM, VIA: HCPCS | Performed by: INTERNAL MEDICINE

## 2024-06-01 PROCEDURE — 2000000000 HC ICU R&B

## 2024-06-01 PROCEDURE — 6360000002 HC RX W HCPCS

## 2024-06-01 PROCEDURE — 6370000000 HC RX 637 (ALT 250 FOR IP): Performed by: INTERNAL MEDICINE

## 2024-06-01 PROCEDURE — 85014 HEMATOCRIT: CPT

## 2024-06-01 RX ORDER — DONEPEZIL HYDROCHLORIDE 5 MG/1
10 TABLET, FILM COATED ORAL NIGHTLY
Status: DISCONTINUED | OUTPATIENT
Start: 2024-06-01 | End: 2024-06-05 | Stop reason: HOSPADM

## 2024-06-01 RX ORDER — GAUZE BANDAGE 2" X 2"
100 BANDAGE TOPICAL DAILY
Status: DISCONTINUED | OUTPATIENT
Start: 2024-06-02 | End: 2024-06-05 | Stop reason: HOSPADM

## 2024-06-01 RX ORDER — FOLIC ACID 1 MG/1
1 TABLET ORAL DAILY
Status: DISCONTINUED | OUTPATIENT
Start: 2024-06-02 | End: 2024-06-05 | Stop reason: HOSPADM

## 2024-06-01 RX ORDER — PANTOPRAZOLE SODIUM 40 MG/1
40 TABLET, DELAYED RELEASE ORAL
Status: DISCONTINUED | OUTPATIENT
Start: 2024-06-01 | End: 2024-06-05 | Stop reason: HOSPADM

## 2024-06-01 RX ORDER — LANOLIN ALCOHOL/MO/W.PET/CERES
1000 CREAM (GRAM) TOPICAL DAILY
Status: DISCONTINUED | OUTPATIENT
Start: 2024-06-02 | End: 2024-06-05 | Stop reason: HOSPADM

## 2024-06-01 RX ADMIN — SODIUM CHLORIDE, POTASSIUM CHLORIDE, SODIUM LACTATE AND CALCIUM CHLORIDE: 600; 310; 30; 20 INJECTION, SOLUTION INTRAVENOUS at 05:45

## 2024-06-01 RX ADMIN — SODIUM CHLORIDE, PRESERVATIVE FREE 40 MG: 5 INJECTION INTRAVENOUS at 11:09

## 2024-06-01 RX ADMIN — SODIUM CHLORIDE, PRESERVATIVE FREE 10 ML: 5 INJECTION INTRAVENOUS at 09:19

## 2024-06-01 RX ADMIN — SALINE NASAL SPRAY 2 SPRAY: 1.5 SOLUTION NASAL at 20:14

## 2024-06-01 RX ADMIN — SODIUM CHLORIDE, POTASSIUM CHLORIDE, SODIUM LACTATE AND CALCIUM CHLORIDE: 600; 310; 30; 20 INJECTION, SOLUTION INTRAVENOUS at 15:49

## 2024-06-01 RX ADMIN — SODIUM CHLORIDE, PRESERVATIVE FREE 10 ML: 5 INJECTION INTRAVENOUS at 20:13

## 2024-06-01 RX ADMIN — FOLIC ACID 1 MG: 5 INJECTION, SOLUTION INTRAMUSCULAR; INTRAVENOUS; SUBCUTANEOUS at 09:19

## 2024-06-01 RX ADMIN — SALINE NASAL SPRAY 2 SPRAY: 1.5 SOLUTION NASAL at 17:10

## 2024-06-01 RX ADMIN — DONEPEZIL HYDROCHLORIDE 10 MG: 5 TABLET, FILM COATED ORAL at 20:09

## 2024-06-01 RX ADMIN — SODIUM CHLORIDE, PRESERVATIVE FREE 40 MG: 5 INJECTION INTRAVENOUS at 00:20

## 2024-06-01 RX ADMIN — SODIUM CHLORIDE 125 MG: 9 INJECTION, SOLUTION INTRAVENOUS at 15:22

## 2024-06-01 RX ADMIN — SERTRALINE 25 MG: 50 TABLET, FILM COATED ORAL at 12:16

## 2024-06-01 RX ADMIN — PANTOPRAZOLE SODIUM 40 MG: 40 TABLET, DELAYED RELEASE ORAL at 16:34

## 2024-06-01 ASSESSMENT — PAIN SCALES - PAIN ASSESSMENT IN ADVANCED DEMENTIA (PAINAD)
BREATHING: NORMAL
BODYLANGUAGE: RELAXED
BODYLANGUAGE: RELAXED
TOTALSCORE: 0
BREATHING: NORMAL
TOTALSCORE: 0
BODYLANGUAGE: RELAXED
BREATHING: NORMAL
CONSOLABILITY: NO NEED TO CONSOLE
CONSOLABILITY: NO NEED TO CONSOLE
FACIALEXPRESSION: SMILING OR INEXPRESSIVE
BREATHING: NORMAL
CONSOLABILITY: NO NEED TO CONSOLE
BODYLANGUAGE: RELAXED
BODYLANGUAGE: RELAXED
TOTALSCORE: 0
CONSOLABILITY: NO NEED TO CONSOLE
BREATHING: NORMAL
FACIALEXPRESSION: SMILING OR INEXPRESSIVE
FACIALEXPRESSION: SMILING OR INEXPRESSIVE
CONSOLABILITY: NO NEED TO CONSOLE
CONSOLABILITY: NO NEED TO CONSOLE
FACIALEXPRESSION: SMILING OR INEXPRESSIVE
TOTALSCORE: 0
FACIALEXPRESSION: SMILING OR INEXPRESSIVE
CONSOLABILITY: NO NEED TO CONSOLE
TOTALSCORE: 0
BREATHING: NORMAL
BODYLANGUAGE: RELAXED
TOTALSCORE: 0
BODYLANGUAGE: RELAXED
BREATHING: NORMAL
TOTALSCORE: 0

## 2024-06-01 ASSESSMENT — PAIN SCALES - GENERAL
PAINLEVEL_OUTOF10: 0

## 2024-06-01 NOTE — PLAN OF CARE
Problem: Anxiety  Goal: Will report anxiety at manageable levels  Description: INTERVENTIONS:  1. Administer medication as ordered  2. Teach and rehearse alternative coping skills  3. Provide emotional support with 1:1 interaction with staff  6/1/2024 0858 by Sunshine Hutchinson RN  Outcome: Not Progressing     Problem: Discharge Planning  Goal: Discharge to home or other facility with appropriate resources  6/1/2024 0858 by Sunshine Hutchinson RN  Outcome: Progressing     Problem: Skin/Tissue Integrity  Goal: Absence of new skin breakdown  Description: 1.  Monitor for areas of redness and/or skin breakdown  2.  Assess vascular access sites hourly  3.  Every 4-6 hours minimum:  Change oxygen saturation probe site  4.  Every 4-6 hours:  If on nasal continuous positive airway pressure, respiratory therapy assess nares and determine need for appliance change or resting period.  6/1/2024 0858 by Sunshine Hutchinson RN  Outcome: Progressing     Problem: ABCDS Injury Assessment  Goal: Absence of physical injury  6/1/2024 0858 by Sunshine Hutchinson RN  Outcome: Progressing     Problem: Safety - Adult  Goal: Free from fall injury  6/1/2024 0858 by Sunshine Hutchinson RN  Outcome: Progressing     Problem: Neurosensory - Adult  Goal: Achieves maximal functionality and self care  6/1/2024 0858 by Sunshine Hutchinson RN  Outcome: Progressing    Problem: Respiratory - Adult  Goal: Achieves optimal ventilation and oxygenation  6/1/2024 0858 by Sunshine Hutchinson RN  Outcome: Progressing     Problem: Cardiovascular - Adult  Goal: Maintains optimal cardiac output and hemodynamic stability  6/1/2024 0858 by Sunshine Hutchinson RN  Outcome: Progressing  Goal: Absence of cardiac dysrhythmias or at baseline  6/1/2024 0858 by Sunshine Hutchinson RN  Outcome: Progressing     Problem: Skin/Tissue Integrity - Adult  Goal: Skin integrity remains intact  6/1/2024 0858 by Sunshine Hutchinson RN  Outcome:  RN  Outcome: Progressing     Problem: Hematologic - Adult  Goal: Maintains hematologic stability  6/1/2024 0858 by Sunshine Hutchinson, RN  Outcome: Progressing     Problem: Anxiety  Goal: Will report anxiety at manageable levels  Description: INTERVENTIONS:  1. Administer medication as ordered  2. Teach and rehearse alternative coping skills  3. Provide emotional support with 1:1 interaction with staff  6/1/2024 0858 by Sunshine Hutchinson, RN  Outcome: Not Progressing

## 2024-06-01 NOTE — PROGRESS NOTES
Patient seen and evaluate by ENT resident yesterday 5/31/24. Rhinorocket has since been removed and there is no residual bleeding appreciated. Anterior rhinoscopy was performed bilaterally and there was no identifiable source of bleeding. Nares were dry and there was crusting present. Recommend starting nasal saline TID. Patient can resume Eliquis in 2-3 days from ENT standpoint.     Electronically signed by Jacobo Fong DO on 6/1/2024 at 3:40 PM

## 2024-06-01 NOTE — PROGRESS NOTES
Cardiology  Progress Note      SUBJECTIVE:  No chest pain. No dyspnea.  Confused.      Current Inpatient Medications  Current Facility-Administered Medications: dexmedeTOMIDine (PRECEDEX) 400 mcg in sodium chloride 0.9 % 100 mL infusion, 0.1-1.5 mcg/kg/hr, IntraVENous, Continuous  thiamine (B-1) 100 mg in sodium chloride 0.9 % 100 mL IVPB, 100 mg, IntraVENous, Q24H  folic acid 1 mg in sodium chloride 0.9 % 50 mL IVPB, 1 mg, IntraVENous, Daily  cyanocobalamin injection 1,000 mcg, 1,000 mcg, SubCUTAneous, Daily  vitamin D (ERGOCALCIFEROL) capsule 50,000 Units, 50,000 Units, Oral, Weekly  [COMPLETED] ferric gluconate (FERRLECIT) 25 mg in sodium chloride 0.9 % 50 mL (test dose) IVPB, 25 mg, IntraVENous, Once **FOLLOWED BY** [COMPLETED] ferric gluconate (FERRLECIT) 100 mg in sodium chloride 0.9 % 100 mL IVPB, 100 mg, IntraVENous, Once **FOLLOWED BY** ferric gluconate (FERRLECIT) 125 mg in sodium chloride 0.9 % 100 mL IVPB, 125 mg, IntraVENous, Daily  sodium chloride flush 0.9 % injection 5-40 mL, 5-40 mL, IntraVENous, 2 times per day  sodium chloride flush 0.9 % injection 5-40 mL, 5-40 mL, IntraVENous, PRN  0.9 % sodium chloride infusion, , IntraVENous, PRN  potassium chloride 20 mEq/50 mL IVPB (Central Line), 20 mEq, IntraVENous, PRN **OR** potassium chloride 10 mEq/100 mL IVPB (Peripheral Line), 10 mEq, IntraVENous, PRN  magnesium sulfate 2000 mg in 50 mL IVPB premix, 2,000 mg, IntraVENous, PRN  ondansetron (ZOFRAN-ODT) disintegrating tablet 4 mg, 4 mg, Oral, Q8H PRN **OR** ondansetron (ZOFRAN) injection 4 mg, 4 mg, IntraVENous, Q6H PRN  polyethylene glycol (GLYCOLAX) packet 17 g, 17 g, Oral, Daily PRN  acetaminophen (TYLENOL) tablet 650 mg, 650 mg, Oral, Q6H PRN **OR** acetaminophen (TYLENOL) suppository 650 mg, 650 mg, Rectal, Q6H PRN  pantoprazole (PROTONIX) 40 mg in sodium chloride (PF) 0.9 % 10 mL injection, 40 mg, IntraVENous, Q12H  lactated ringers IV soln infusion, , IntraVENous,  hemoglobin level closely    2.coronary artery disease.  Status post CABG in the past.  Nurse no complains of chest pain.  EKG is nondiagnostic due to the presence of basement activity.  Patient had minimal insignificant study troponin elevation at 22 with no trending troponin elevation which usually goes against acute ischemic event.  Patient has no complains of chest pain.    3.status post permanent pacemaker for sick sinus syndrome with symptomatic bradycardia in the past.            Electronically signed by Kennedy St MD on 6/1/2024 at 9:21 AM

## 2024-06-01 NOTE — PLAN OF CARE
Problem: Discharge Planning  Goal: Discharge to home or other facility with appropriate resources  5/31/2024 2138 by Shama Lopez RN  Outcome: Not Progressing    Problem: Neurosensory - Adult  Goal: Achieves maximal functionality and self care  5/31/2024 2138 by Shama Lopez RN  Outcome: Progressing    Problem: Skin/Tissue Integrity  Goal: Absence of new skin breakdown  Description: 1.  Monitor for areas of redness and/or skin breakdown  2.  Assess vascular access sites hourly  3.  Every 4-6 hours minimum:  Change oxygen saturation probe site  4.  Every 4-6 hours:  If on nasal continuous positive airway pressure, respiratory therapy assess nares and determine need for appliance change or resting period.  5/31/2024 2138 by Shama Lopez RN  Outcome: Progressing     Problem: ABCDS Injury Assessment  Goal: Absence of physical injury  5/31/2024 2138 by Shama Lopez RN  Outcome: Progressing     Problem: Safety - Adult  Goal: Free from fall injury  5/31/2024 2138 by Shama Lopez RN  Outcome: Progressing     Problem: Neurosensory - Adult  Goal: Achieves maximal functionality and self care  5/31/2024 2138 by Shama Lopez RN  Outcome: Progressing     Problem: Respiratory - Adult  Goal: Achieves optimal ventilation and oxygenation  5/31/2024 2138 by Shama Lopez RN  Outcome: Progressing    Problem: Cardiovascular - Adult  Goal: Maintains optimal cardiac output and hemodynamic stability  5/31/2024 2138 by Shama Lopez RN  Outcome: Progressing    Goal: Absence of cardiac dysrhythmias or at baseline  5/31/2024 2138 by Shama Lopez RN  Outcome: Progressing     Problem: Skin/Tissue Integrity - Adult  Goal: Skin integrity remains intact  5/31/2024 2138 by Shama Lopez RN  Outcome: Progressing    Problem: Musculoskeletal - Adult  Goal: Return mobility to safest level of function  5/31/2024 2138 by Shama Lopez RN  Outcome: Progressing    Problem: Gastrointestinal - Adult  Goal: Minimal or absence of nausea and

## 2024-06-01 NOTE — PROCEDURES
[address] [address]                             PROCEDURE NOTE      PATIENT NAME: MIGEL RICHARDS            : 1950  MED REC NO: [id number]                        ROOM: [facility]  ACCOUNT NO: [id number]                       ADMIT DATE: 2024  PROVIDER: Tucker Sewell      DATE OF PROCEDURE:  2024    SURGEON:  Tucker Sewell    PROCEDURE:  Endoscopy.    PREOPERATIVE DIAGNOSES:    1. Large epistaxis.  2. Melena, rule out upper gastrointestinal bleed.    POSTOPERATIVE DIAGNOSES:    1. Small amount of blood clot in the oropharyngeal space, most likely from previous nosebleed.  2. Normal esophagus.  3. Small hiatal hernia.    4. Normal stomach and proximal duodenum with no ulcers or bleeding seen.    ESTIMATED BLOOD LOSS:  None.    GI DOCTOR:  Tucker Sewell MD    DESCRIPTION OF PROCEDURE:  The patient was brought to the endoscopy room after appropriate consent taken from the daughter over the phone and under monitored anesthesia sedation.  After examining the patient and talking to him prior to the procedure, endoscopy was performed.  The scope was advanced into the mouth, the oropharyngeal area, then into the esophagus, up to the stomach and the proximal duodenum to the second portion of the duodenum.  Blood clots, old, noted in the oropharyngeal area, most likely from previous nosebleed.  Normal esophagus with GE junction noted at 43 cm.  Small hiatal hernia was seen with normal gastric mucosa.  No masses or tumors or ulcers or bleeding seen in the gastric space.  Normal proximal duodenum with normal proximal duodenal mucosa.  No ulcers seen.  Scope was then pulled in the stomach, stomach decompressed and scope completely removed from the patient's body.    RECOMMENDATIONS:    1. ENT followup.    2. If H and H drops further, then most likely, the patient has intermittent nosebleed with postnasal dripping and he swallows

## 2024-06-01 NOTE — PROGRESS NOTES
Internal Medicine Consult Note    [facility]PHINE=[facility]    Mustapha Sims D.O., JEOTonyI.                    Doug Keller D.O., JEOTonyI.                             Devyn Hemphill D.O.     Anabella Burgess, MSN, APRN, NP-C  Kevin Perez, MSN, APRN-CNP  Kj Carreon, MSN, APRN-CNP  Rosenda Ruiz, MSN APRN-CNP  Parul Hill, MSN. APRN-NP-C     Primary Care Physician: Joshua Vitale MD   Admitting Physician:  Mustapha Sims DO  Admission date and time: 5/30/2024 10:51 AM    Room:  [place] [address]  Admitting diagnosis: GI bleed [K92.2]    Patient Name: Faisal Pathak  MRN: [id number]    Date of Service: 6/1/2024     Subjective:  Faisal is a 73 y.o. male who was seen and examined today,6/1/2024, at the bedside.  Faisal seems to be resting comfortably and remains pleasantly confused in the setting of known dementia.  He underwent EGD evaluation yesterday with no active bleeding identified but small amount of blood clot in the oropharyngeal space likely from previous nosebleed.  No family members are present during my examination. Hemoglobin remains stable at this point.  We discussed advancement in his diet and transfer from the intensive care unit.    Review of System:   Constitutional:   Pleasantly confused.  Admits to generalized malaise and fatigue.  HEENT:   Denies ear pain, sore throat, sinus or eye problems.  No additional nosebleeding.  Cardiovascular:   Denies any chest pain, irregular heartbeats, or palpitations.   Respiratory:   Denies shortness of breath, coughing, sputum production, hemoptysis, or wheezing.  Gastrointestinal:   Admits to a decreased appetite and therefore decreased oral intake.  Genitourinary:    Denies any urgency, frequency, hematuria. Voiding  without difficulty.  Extremities:   Denies lower extremity swelling, edema or cyanosis.   Neurology:    Denies any headache or focal neurological deficits, admits to generalized weakness and deconditioning.  Somewhat  hypertension.  History of hyperlipidemia, on Pravachol.  Overweight with elevated body mass index of 29.94.  Current use of nicotine  Vitamin deficiencies including vitamin B12/vitamin D      Plan:   Hemoglobin has stabilized at this point.  EGD did not reveal active upper GI bleeding.  Anticoagulation therapy remains on hold and we will discuss resumption of anticoagulation therapy with the critical care and ENT teams.  Otherwise, chronic comorbidities are stable.  Diet can be advanced.  He requires increasing work with the therapy teams.  He is acceptable for transfer from the intensive care unit.  We are monitoring his hospital-acquired delirium closely in the setting of known Alzheimer's dementia.  Eventual goal is to return home.  I do not believe he is far from his baseline at this point.  No family members were present during my examination.    Greater than 40 minutes of critical care time was spent with the patient. This includes chart review, , and discussion with those consultants involved in the patient's care.     More than 50% of my  time was spent at the bedside counseling/coordinating care with the patient and/or family with face to face contact.  This time was spent reviewing notes and laboratory data as well as instructing and counseling the patient. Time I spent with the family or surrogate(s) is included only if the patient was incapable of providing the necessary information or participating in medical decisions. I also discussed the differential diagnosis and all of the proposed management plans with the patient and individuals accompanying the patient.    Doug Keller DO, F.A.ANNAO.I.  6/1/2024  8:07 AM

## 2024-06-01 NOTE — PROGRESS NOTES
Comprehensive Nutrition Assessment    Type and Reason for Visit:  Initial, Positive Nutrition Screen    Nutrition Recommendations/Plan:   Continue current diet and advance as tolerated   Recommend Ensure Plus HP TID        Malnutrition Assessment:  Malnutrition Status:  At risk for malnutrition (Comment) (06/01/24 0212)    Context:  Chronic Illness     Findings of the 6 clinical characteristics of malnutrition:  Energy Intake:  75% or less estimated energy requirements for 1 month or longer  Weight Loss:  Unable to assess (d/t lack of accurate wt hx per EMR)     Body Fat Loss:  No significant body fat loss     Muscle Mass Loss:  No significant muscle mass loss    Fluid Accumulation:  No significant fluid accumulation     Strength:  Not Performed    Nutrition Assessment:    Pt admit w/ severe epistaxis, melena. Transferred from [facility] for ENT & concern about hemodynamic stability. Rhinorocket placed - now removed. PMHx of MI, GERD, CAD, Afib on eliquis, HTN/HLD,  [place] acquired delirium noted on top of alzheimer's dementia. Pt has been advanced to soft & bite sized diet, PO intake decr., will add ONS & encourage PO intake.    Nutrition Related Findings:    abd soft, NT, ND, active BSx4, no edema noted, I/O's +2.38L since admit, pleasantly confused Wound Type: None       Current Nutrition Intake & Therapies:    Average Meal Intake: 26-50%  Average Supplements Intake: None Ordered  ADULT DIET; Dysphagia - Soft and Bite Sized  ADULT ORAL NUTRITION SUPPLEMENT; Breakfast, Lunch, Dinner; Standard High Calorie/High Protein Oral Supplement    Anthropometric Measures:  Height: 188 cm (6' 2.02\")  Ideal Body Weight (IBW): 190 lbs (86 kg)    Admission Body Weight: 105.8 kg (233 lb 3 oz) (5/30 bed scale)  Current Body Weight: 105.7 kg (233 lb 0.4 oz) (6/1 bed scale), 122.6 % IBW. Weight Source: Bed Scale  Current BMI (kg/m2): 29.9  Usual Body Weight:  (JENNIFER d/t lack of accurate wt hx per EMR)  Weight Adjustment For: No

## 2024-06-01 NOTE — PROGRESS NOTES
Blood:445]  Out: 1600     Oxygen requirements: RA    Ventilator Information:       General appearance: Ill looking,Pale, not in pain or distress, in no respiratory distress    HEENT: Atraumatic/normocephalic, EOMI, DAVID, pharynx clear, moist mucosa, redness of the uvula appreciated,   Neck: Supple, no jugular venous distension, lymphadenopathy, thyromegaly or carotid bruits  Chest: Decreased  breath sounds, no wheezing, no crackles and no tenderness over ribs   Cardiovascular: Normal S1 , S2, regular rate and rhythm, no murmur, rub or gallop  Abdomen: Normal sounds present, soft, lax with no tenderness, no hepatosplenomegaly, and no masses  Extremities: No edema. Pulses are equally present.   Skin: intact, no rashes   Neurologic: Alert and oriented x 1-2, confused and intermittently no focal deficit     Investigations:  Labs, radiological imaging and cardiac work up were personally reviewed        ICU STAFF PHYSICIAN NOTE OF PERSONAL INVOLVEMENT IN CARE  As the attending physician, I certify that I personally reviewed the patient's history and personally examined the patient to confirm the physical findings described above, and that I reviewed the relevant imaging studies and available reports.  I also discussed the differential diagnosis and all of the proposed management plans with the patient and individuals accompanying the patient to this visit.  They had the opportunity to ask questions about the proposed management plans and to have those questions answered.    This patient has a high probability of sudden, clinically significant deterioration, which requires the highest level of physician preparedness to intervene urgently.  I managed/supervised life or organ supporting interventions that required frequent physician assessment.  I devoted my full attention to the direct care of this patient for the amount of time indicated below.  Time I spent with family or surrogate(s) is included only if the patient was

## 2024-06-02 LAB
ALBUMIN SERPL-MCNC: 3 G/DL (ref 3.5–5.2)
ALP SERPL-CCNC: 84 U/L (ref 40–129)
ALT SERPL-CCNC: 19 U/L (ref 0–40)
ANION GAP SERPL CALCULATED.3IONS-SCNC: 8 MMOL/L (ref 7–16)
AST SERPL-CCNC: 22 U/L (ref 0–39)
BASOPHILS # BLD: 0.03 K/UL (ref 0–0.2)
BASOPHILS NFR BLD: 0 % (ref 0–2)
BILIRUB SERPL-MCNC: 0.6 MG/DL (ref 0–1.2)
BUN SERPL-MCNC: 11 MG/DL (ref 6–23)
CALCIUM SERPL-MCNC: 8.4 MG/DL (ref 8.6–10.2)
CHLORIDE SERPL-SCNC: 102 MMOL/L (ref 98–107)
CO2 SERPL-SCNC: 24 MMOL/L (ref 22–29)
CREAT SERPL-MCNC: 0.8 MG/DL (ref 0.7–1.2)
EOSINOPHIL # BLD: 0.08 K/UL (ref 0.05–0.5)
EOSINOPHILS RELATIVE PERCENT: 1 % (ref 0–6)
ERYTHROCYTE [DISTWIDTH] IN BLOOD BY AUTOMATED COUNT: 14.6 % (ref 11.5–15)
GFR, ESTIMATED: >90 ML/MIN/1.73M2
GLUCOSE SERPL-MCNC: 101 MG/DL (ref 74–99)
HCT VFR BLD AUTO: 25.3 % (ref 37–54)
HGB BLD-MCNC: 8.3 G/DL (ref 12.5–16.5)
IMM GRANULOCYTES # BLD AUTO: 0.03 K/UL (ref 0–0.58)
IMM GRANULOCYTES NFR BLD: 0 % (ref 0–5)
INR PPP: 1.2
LYMPHOCYTES NFR BLD: 1.79 K/UL (ref 1.5–4)
LYMPHOCYTES RELATIVE PERCENT: 22 % (ref 20–42)
MAGNESIUM SERPL-MCNC: 1.7 MG/DL (ref 1.6–2.6)
MCH RBC QN AUTO: 28.2 PG (ref 26–35)
MCHC RBC AUTO-ENTMCNC: 32.8 G/DL (ref 32–34.5)
MCV RBC AUTO: 86.1 FL (ref 80–99.9)
MONOCYTES NFR BLD: 0.7 K/UL (ref 0.1–0.95)
MONOCYTES NFR BLD: 9 % (ref 2–12)
NEUTROPHILS NFR BLD: 68 % (ref 43–80)
NEUTS SEG NFR BLD: 5.57 K/UL (ref 1.8–7.3)
PARTIAL THROMBOPLASTIN TIME: 31.3 SEC (ref 24.5–35.1)
PHOSPHATE SERPL-MCNC: 2.3 MG/DL (ref 2.5–4.5)
PLATELET # BLD AUTO: 225 K/UL (ref 130–450)
PMV BLD AUTO: 10.3 FL (ref 7–12)
POTASSIUM SERPL-SCNC: 3.6 MMOL/L (ref 3.5–5)
PROT SERPL-MCNC: 5.7 G/DL (ref 6.4–8.3)
PROTHROMBIN TIME: 13.9 SEC (ref 9.3–12.4)
RBC # BLD AUTO: 2.94 M/UL (ref 3.8–5.8)
SODIUM SERPL-SCNC: 134 MMOL/L (ref 132–146)
WBC OTHER # BLD: 8.2 K/UL (ref 4.5–11.5)

## 2024-06-02 PROCEDURE — 2500000003 HC RX 250 WO HCPCS

## 2024-06-02 PROCEDURE — 6370000000 HC RX 637 (ALT 250 FOR IP): Performed by: INTERNAL MEDICINE

## 2024-06-02 PROCEDURE — 2580000003 HC RX 258: Performed by: INTERNAL MEDICINE

## 2024-06-02 PROCEDURE — 36415 COLL VENOUS BLD VENIPUNCTURE: CPT

## 2024-06-02 PROCEDURE — 85730 THROMBOPLASTIN TIME PARTIAL: CPT

## 2024-06-02 PROCEDURE — 85025 COMPLETE CBC W/AUTO DIFF WBC: CPT

## 2024-06-02 PROCEDURE — 6360000002 HC RX W HCPCS

## 2024-06-02 PROCEDURE — 2000000000 HC ICU R&B

## 2024-06-02 PROCEDURE — 99291 CRITICAL CARE FIRST HOUR: CPT | Performed by: INTERNAL MEDICINE

## 2024-06-02 PROCEDURE — 2580000003 HC RX 258

## 2024-06-02 PROCEDURE — 84100 ASSAY OF PHOSPHORUS: CPT

## 2024-06-02 PROCEDURE — 80053 COMPREHEN METABOLIC PANEL: CPT

## 2024-06-02 PROCEDURE — 83735 ASSAY OF MAGNESIUM: CPT

## 2024-06-02 PROCEDURE — 85610 PROTHROMBIN TIME: CPT

## 2024-06-02 RX ORDER — POTASSIUM CHLORIDE 7.45 MG/ML
10 INJECTION INTRAVENOUS
Status: COMPLETED | OUTPATIENT
Start: 2024-06-02 | End: 2024-06-02

## 2024-06-02 RX ORDER — MAGNESIUM SULFATE IN WATER 40 MG/ML
2000 INJECTION, SOLUTION INTRAVENOUS ONCE
Status: COMPLETED | OUTPATIENT
Start: 2024-06-02 | End: 2024-06-02

## 2024-06-02 RX ADMIN — POTASSIUM CHLORIDE 10 MEQ: 7.46 INJECTION, SOLUTION INTRAVENOUS at 08:09

## 2024-06-02 RX ADMIN — MAGNESIUM SULFATE HEPTAHYDRATE 2000 MG: 40 INJECTION, SOLUTION INTRAVENOUS at 06:54

## 2024-06-02 RX ADMIN — POTASSIUM CHLORIDE 10 MEQ: 7.46 INJECTION, SOLUTION INTRAVENOUS at 06:56

## 2024-06-02 RX ADMIN — SODIUM CHLORIDE 125 MG: 9 INJECTION, SOLUTION INTRAVENOUS at 09:13

## 2024-06-02 RX ADMIN — SODIUM CHLORIDE, PRESERVATIVE FREE 10 ML: 5 INJECTION INTRAVENOUS at 07:26

## 2024-06-02 RX ADMIN — SODIUM CHLORIDE, POTASSIUM CHLORIDE, SODIUM LACTATE AND CALCIUM CHLORIDE: 600; 310; 30; 20 INJECTION, SOLUTION INTRAVENOUS at 23:28

## 2024-06-02 RX ADMIN — SALINE NASAL SPRAY 2 SPRAY: 1.5 SOLUTION NASAL at 20:15

## 2024-06-02 RX ADMIN — SERTRALINE 25 MG: 50 TABLET, FILM COATED ORAL at 08:01

## 2024-06-02 RX ADMIN — DONEPEZIL HYDROCHLORIDE 10 MG: 5 TABLET, FILM COATED ORAL at 20:16

## 2024-06-02 RX ADMIN — CYANOCOBALAMIN TAB 1000 MCG 1000 MCG: 1000 TAB at 08:03

## 2024-06-02 RX ADMIN — Medication 100 MG: at 08:01

## 2024-06-02 RX ADMIN — APIXABAN 2.5 MG: 2.5 TABLET, FILM COATED ORAL at 20:16

## 2024-06-02 RX ADMIN — SODIUM CHLORIDE, POTASSIUM CHLORIDE, SODIUM LACTATE AND CALCIUM CHLORIDE: 600; 310; 30; 20 INJECTION, SOLUTION INTRAVENOUS at 01:46

## 2024-06-02 RX ADMIN — SODIUM CHLORIDE, PRESERVATIVE FREE 10 ML: 5 INJECTION INTRAVENOUS at 20:14

## 2024-06-02 RX ADMIN — SALINE NASAL SPRAY 2 SPRAY: 1.5 SOLUTION NASAL at 14:03

## 2024-06-02 RX ADMIN — SODIUM PHOSPHATE, MONOBASIC, MONOHYDRATE AND SODIUM PHOSPHATE, DIBASIC, ANHYDROUS 20 MMOL: 276; 142 INJECTION, SOLUTION INTRAVENOUS at 07:24

## 2024-06-02 RX ADMIN — PANTOPRAZOLE SODIUM 40 MG: 40 TABLET, DELAYED RELEASE ORAL at 16:19

## 2024-06-02 RX ADMIN — PANTOPRAZOLE SODIUM 40 MG: 40 TABLET, DELAYED RELEASE ORAL at 06:51

## 2024-06-02 RX ADMIN — FOLIC ACID 1 MG: 1 TABLET ORAL at 08:00

## 2024-06-02 ASSESSMENT — PAIN SCALES - PAIN ASSESSMENT IN ADVANCED DEMENTIA (PAINAD)
BREATHING: NORMAL
BODYLANGUAGE: RELAXED
FACIALEXPRESSION: SMILING OR INEXPRESSIVE
BODYLANGUAGE: RELAXED
FACIALEXPRESSION: SMILING OR INEXPRESSIVE
FACIALEXPRESSION: SMILING OR INEXPRESSIVE
CONSOLABILITY: NO NEED TO CONSOLE
BREATHING: NORMAL
BREATHING: NORMAL
TOTALSCORE: 0
TOTALSCORE: 0
CONSOLABILITY: NO NEED TO CONSOLE
BREATHING: NORMAL
TOTALSCORE: 0
BODYLANGUAGE: RELAXED
BODYLANGUAGE: RELAXED
FACIALEXPRESSION: SMILING OR INEXPRESSIVE
CONSOLABILITY: NO NEED TO CONSOLE
FACIALEXPRESSION: SMILING OR INEXPRESSIVE
FACIALEXPRESSION: SMILING OR INEXPRESSIVE
TOTALSCORE: 0
TOTALSCORE: 0
FACIALEXPRESSION: SMILING OR INEXPRESSIVE
BODYLANGUAGE: RELAXED
CONSOLABILITY: NO NEED TO CONSOLE
CONSOLABILITY: NO NEED TO CONSOLE
FACIALEXPRESSION: SMILING OR INEXPRESSIVE
BREATHING: NORMAL
BREATHING: NORMAL
TOTALSCORE: 0
FACIALEXPRESSION: SMILING OR INEXPRESSIVE
CONSOLABILITY: NO NEED TO CONSOLE
TOTALSCORE: 0
BODYLANGUAGE: RELAXED
BODYLANGUAGE: RELAXED
BREATHING: NORMAL
CONSOLABILITY: NO NEED TO CONSOLE
TOTALSCORE: 0
FACIALEXPRESSION: SMILING OR INEXPRESSIVE
TOTALSCORE: 0
TOTALSCORE: 0
FACIALEXPRESSION: SMILING OR INEXPRESSIVE
BREATHING: NORMAL
FACIALEXPRESSION: SMILING OR INEXPRESSIVE
BODYLANGUAGE: RELAXED
CONSOLABILITY: NO NEED TO CONSOLE
BREATHING: NORMAL
TOTALSCORE: 0
CONSOLABILITY: NO NEED TO CONSOLE
BODYLANGUAGE: RELAXED
TOTALSCORE: 0
BREATHING: NORMAL
CONSOLABILITY: NO NEED TO CONSOLE

## 2024-06-02 ASSESSMENT — PAIN SCALES - GENERAL: PAINLEVEL_OUTOF10: 0

## 2024-06-02 NOTE — PLAN OF CARE
Problem: Skin/Tissue Integrity  Goal: Absence of new skin breakdown  Description: 1.  Monitor for areas of redness and/or skin breakdown  2.  Assess vascular access sites hourly  3.  Every 4-6 hours minimum:  Change oxygen saturation probe site  4.  Every 4-6 hours:  If on nasal continuous positive airway pressure, respiratory therapy assess nares and determine need for appliance change or resting period.  6/1/2024 2023 by Jossue Oliva RN  Outcome: Progressing     Problem: ABCDS Injury Assessment  Goal: Absence of physical injury  6/1/2024 2023 by Jossue Oliva RN  Outcome: Progressing     Problem: Safety - Adult  Goal: Free from fall injury  6/1/2024 2023 by Jossue Oliva RN  Outcome: Progressing     Problem: Respiratory - Adult  Goal: Achieves optimal ventilation and oxygenation  6/1/2024 1550 by Jossue Oliva RN  Outcome: Completed     Problem: Cardiovascular - Adult  Goal: Maintains optimal cardiac output and hemodynamic stability  6/1/2024 2023 by Jossue Oliva RN  Outcome: Completed     Problem: Cardiovascular - Adult  Goal: Absence of cardiac dysrhythmias or at baseline  6/1/2024 2023 by Jossue Oliva RN  Outcome: Completed     Problem: Skin/Tissue Integrity - Adult  Goal: Skin integrity remains intact  6/1/2024 2023 by Jossue Oliva RN  Outcome: Progressing     Problem: Musculoskeletal - Adult  Goal: Maintain proper alignment of affected body part  Outcome: Progressing     Problem: Musculoskeletal - Adult  Goal: Return ADL status to a safe level of function  Outcome: Progressing     Problem: Gastrointestinal - Adult  Goal: Minimal or absence of nausea and vomiting  6/1/2024 1550 by Jossue Oliva RN  Outcome: Completed     Problem: Genitourinary - Adult  Goal: Absence of urinary retention  6/1/2024 1550 by Jossue Oliva RN  Outcome: Completed     Problem: Infection - Adult  Goal: Absence of infection during hospitalization  Outcome: Progressing     Problem:

## 2024-06-02 NOTE — PROGRESS NOTES
2024: Patient continues to be intermittently confused and agitated.  Hemoglobin remained stable however patient was started on anticoagulation which increases his risk of epistaxis.  We will continue to monitor him in the ICU for bleeding while on Eliquis.  He has no fever, chills, rigors.  Patient remains confused.  Patient continues to have tarry stool.      Past Medical History:  Past Medical History:   Diagnosis Date    Atrial fibrillation (HCC)     Bleeding tendency (HCC)     Dementia (HCC)     Epistaxis 05/30/2024    Hand pain     Heart attack (HCC) 2004    History of blood transfusion     Hyperlipidemia     Numbness     Sick sinus syndrome (HCC)       Past Surgical History:   Procedure Laterality Date    CERVICAL FUSION  2003    Dr. Joseph / [facility]     CERVICAL FUSION  09/2019    multilevel posterior @ [facility]    CORONARY ARTERY BYPASS GRAFT  2004    ENDOSCOPY, COLON, DIAGNOSTIC  05/31/2024    by Dr. Massey    PACEMAKER PLACEMENT         Family History:   Family History   Problem Relation Age of Onset    Cancer Mother     Cancer Father     Heart Attack Father         Allergies:         Patient has no known allergies.    Social history:  Social History     Socioeconomic History    Marital status:      Spouse name: Not on file    Number of children: Not on file    Years of education: Not on file    Highest education level: Not on file   Occupational History    Not on file   Tobacco Use    Smoking status: Every Day    Smokeless tobacco: Never   Vaping Use    Vaping Use: Never used   Substance and Sexual Activity    Alcohol use: Yes     Comment: rarely    Drug use: Never    Sexual activity: Not on file   Other Topics Concern    Not on file   Social History Narrative    Not on file     Social Determinants of Health     Financial Resource Strain: Not on file   Food Insecurity: No Food Insecurity (5/30/2024)    Hunger Vital Sign     Worried About Running Out of Food in the Last Year: Never  the patient to this visit.  They had the opportunity to ask questions about the proposed management plans and to have those questions answered.    This patient has a high probability of sudden, clinically significant deterioration, which requires the highest level of physician preparedness to intervene urgently.  I managed/supervised life or organ supporting interventions that required frequent physician assessment.  I devoted my full attention to the direct care of this patient for the amount of time indicated below.  Time I spent with family or surrogate(s) is included only if the patient was incapable of providing the necessary information or participating in medical decision-making.  Time devoted to teaching and to any procedures I billed separately is not included.  Critical Care Time: 31 minutes      Electronically signed by Sesar Huffman MD on 6/2/2024 at 12:18 PM

## 2024-06-02 NOTE — PROGRESS NOTES
Internal Medicine Consult Note    [facility]    Mustapha Sims D.O., JEOTonyI.                    Doug Keller D.O., EWNDYITony Hemphill D.O.     Anabella Burgess, MSN, APRN, NP-C  Kevin Perez, MSN, APRN-CNP  Kj Carreon, MSN, APRN-CNP  Rosenda Ruiz, MSN APRN-CNP  Parul Hill, MSN. APRN-NP-C     Primary Care Physician: Joshua Vitale MD   Admitting Physician:  Mustapha Sims DO  Admission date and time: 5/30/2024 10:51 AM    Room:  [place] [address]  Admitting diagnosis: GI bleed [K92.2]    Patient Name: Faisal Pathak  MRN: [id number]    Date of Service: 6/2/2024     Subjective:  Faisal is a 73 y.o. male who was seen and examined today,6/2/2024, at the bedside.  Faisal is pleasantly confused during my examination and is likely approaching baseline.  Hemoglobin remains relatively stable at this point and recommendations are for resumption of Eliquis therapy as per the cardiovascular team.  Diet will be advanced as tolerated.  He requires extensive work with the therapy teams.  We discussed potential transfer from the intensive care unit.    Review of System:   Constitutional:   Pleasantly confused.  Admits to generalized malaise and fatigue.  HEENT:   Denies ear pain, sore throat, sinus or eye problems.  No additional nosebleeding.  Cardiovascular:   Denies any chest pain, irregular heartbeats, or palpitations.   Respiratory:   Denies shortness of breath, coughing, sputum production, hemoptysis, or wheezing.  Gastrointestinal:   Admits to a decreased appetite and therefore decreased oral intake.  Tolerating a clear liquid diet.  Genitourinary:    Denies any urgency, frequency, hematuria. Voiding  without difficulty.  Extremities:   Denies lower extremity swelling, edema or cyanosis.   Neurology:    Denies any headache or focal neurological deficits, admits to generalized weakness and deconditioning.  Musculoskeletal:    Denies  myalgias,  IntraVENous Once    potassium chloride  10 mEq IntraVENous Q1H    magnesium sulfate  2,000 mg IntraVENous Once    [Held by provider] apixaban  5 mg Oral BID    donepezil  10 mg Oral Nightly    sertraline  25 mg Oral Daily    folic acid  1 mg Oral Daily    thiamine mononitrate  100 mg Oral Daily    vitamin B-12  1,000 mcg Oral Daily    pantoprazole  40 mg Oral BID AC    sodium chloride  2 spray Each Nostril TID    vitamin D  50,000 Units Oral Weekly    ferric gluconate (FERRLECIT) 125 mg in sodium chloride 0.9 % 100 mL IVPB  125 mg IntraVENous Daily    sodium chloride flush  5-40 mL IntraVENous 2 times per day     Continuous Infusions:   dexmedeTOMIDine HCl in NaCl Stopped (06/01/24 0110)    sodium chloride      lactated ringers IV soln 100 mL/hr at 06/02/24 0500       Objective Data:  Recent Labs     05/31/24  0331 05/31/24  1311 06/01/24  0502 06/01/24  1215 06/02/24  0459   WBC 12.4*  --  7.9  --  8.2   RBC 2.57*  --  2.87*  --  2.94*   HGB 7.3*   < > 8.3* 8.8* 8.3*   HCT 22.6*   < > 25.0* 27.1* 25.3*   MCV 87.9  --  87.1  --  86.1   MCH 28.4  --  28.9  --  28.2   MCHC 32.3  --  33.2  --  32.8   RDW 14.6  --  14.9  --  14.6     --  193  --  225   MPV 10.6  --  10.4  --  10.3    < > = values in this interval not displayed.     Recent Labs     05/31/24 0331 06/01/24  0502 06/02/24  0459    138 134   K 3.9 4.0 3.6   * 105 102   CO2 25 27 24   BUN 30* 18 11   CREATININE 0.9 0.8 0.8   GLUCOSE 111* 93 101*   CALCIUM 8.4* 8.5* 8.4*   BILITOT 0.5 0.7 0.6   ALKPHOS 65 71 84   AST 11 13 22   ALT 5 7 19   ALBUMIN 3.1* 3.0* 3.0*         Assessment:  Severe epistaxis resulting in blood loss anemia  Hospital-acquired delirium on top of Alzheimer's dementia chronically on Aricept  Coronary artery disease, status post coronary artery bypass grafting.  Atrial fibrillation on Eliquis therapy chronically  History of gastroesophageal reflux disease.  Essential hypertension.  History of hyperlipidemia, on

## 2024-06-02 NOTE — PROGRESS NOTES
Attempted to get patient out of bed to the chair.    Patient was able to stand but stated his back hurt and refused to sit in chair.    Patient placed back in bed.

## 2024-06-02 NOTE — PLAN OF CARE
Problem: Discharge Planning  Goal: Discharge to home or other facility with appropriate resources  Outcome: Progressing     Problem: Skin/Tissue Integrity  Goal: Absence of new skin breakdown  Description: 1.  Monitor for areas of redness and/or skin breakdown  2.  Assess vascular access sites hourly  3.  Every 4-6 hours minimum:  Change oxygen saturation probe site  4.  Every 4-6 hours:  If on nasal continuous positive airway pressure, respiratory therapy assess nares and determine need for appliance change or resting period.  6/2/2024 0834 by Harriet Roth RN  Outcome: Progressing  6/1/2024 2023 by Jossue Oliva RN  Outcome: Progressing     Problem: ABCDS Injury Assessment  Goal: Absence of physical injury  6/2/2024 0834 by Harriet Roth RN  Outcome: Progressing  6/1/2024 2023 by Jossue Oliva RN  Outcome: Progressing     Problem: Safety - Adult  Goal: Free from fall injury  6/2/2024 0834 by Harriet Roth RN  Outcome: Progressing  Note: Sitter at bedside, activity belt applied  6/1/2024 2023 by Jossue Oliva RN  Outcome: Progressing     Problem: Neurosensory - Adult  Goal: Achieves maximal functionality and self care  Outcome: Progressing     Problem: Skin/Tissue Integrity - Adult  Goal: Skin integrity remains intact  6/2/2024 0834 by Harriet Roth RN  Outcome: Progressing  Flowsheets (Taken 6/2/2024 0000 by Jacquelin Gómez RN)  Skin Integrity Remains Intact: Monitor for areas of redness and/or skin breakdown  6/1/2024 2023 by Jossue Oliva RN  Outcome: Progressing     Problem: Musculoskeletal - Adult  Goal: Return mobility to safest level of function  Outcome: Progressing  Goal: Maintain proper alignment of affected body part  6/2/2024 0834 by Harriet Roth RN  Outcome: Progressing  6/1/2024 2023 by Jossue Oliva RN  Outcome: Progressing  Goal: Return ADL status to a safe level of function  6/2/2024 0834 by Harriet Roth RN  Outcome: Progressing  6/1/2024 2023 by

## 2024-06-03 LAB
ABO + RH BLD: NORMAL
ALBUMIN SERPL-MCNC: 2.8 G/DL (ref 3.5–5.2)
ALP SERPL-CCNC: 77 U/L (ref 40–129)
ALT SERPL-CCNC: 14 U/L (ref 0–40)
ANION GAP SERPL CALCULATED.3IONS-SCNC: 8 MMOL/L (ref 7–16)
ARM BAND NUMBER: NORMAL
AST SERPL-CCNC: 16 U/L (ref 0–39)
BASOPHILS # BLD: 0.04 K/UL (ref 0–0.2)
BASOPHILS NFR BLD: 1 % (ref 0–2)
BILIRUB SERPL-MCNC: 0.4 MG/DL (ref 0–1.2)
BLOOD BANK SAMPLE EXPIRATION: NORMAL
BLOOD GROUP ANTIBODIES SERPL: NEGATIVE
BUN SERPL-MCNC: 8 MG/DL (ref 6–23)
CALCIUM SERPL-MCNC: 8.1 MG/DL (ref 8.6–10.2)
CHLORIDE SERPL-SCNC: 105 MMOL/L (ref 98–107)
CO2 SERPL-SCNC: 25 MMOL/L (ref 22–29)
CREAT SERPL-MCNC: 0.8 MG/DL (ref 0.7–1.2)
EOSINOPHIL # BLD: 0.09 K/UL (ref 0.05–0.5)
EOSINOPHILS RELATIVE PERCENT: 1 % (ref 0–6)
ERYTHROCYTE [DISTWIDTH] IN BLOOD BY AUTOMATED COUNT: 15.6 % (ref 11.5–15)
GFR, ESTIMATED: >90 ML/MIN/1.73M2
GLUCOSE SERPL-MCNC: 113 MG/DL (ref 74–99)
HCT VFR BLD AUTO: 23.9 % (ref 37–54)
HCT VFR BLD AUTO: 26.2 % (ref 37–54)
HGB BLD-MCNC: 7.6 G/DL (ref 12.5–16.5)
HGB BLD-MCNC: 8.7 G/DL (ref 12.5–16.5)
IMM GRANULOCYTES # BLD AUTO: 0.05 K/UL (ref 0–0.58)
IMM GRANULOCYTES NFR BLD: 1 % (ref 0–5)
LYMPHOCYTES NFR BLD: 1.1 K/UL (ref 1.5–4)
LYMPHOCYTES RELATIVE PERCENT: 15 % (ref 20–42)
MAGNESIUM SERPL-MCNC: 1.9 MG/DL (ref 1.6–2.6)
MCH RBC QN AUTO: 27.7 PG (ref 26–35)
MCHC RBC AUTO-ENTMCNC: 31.8 G/DL (ref 32–34.5)
MCV RBC AUTO: 87.2 FL (ref 80–99.9)
MONOCYTES NFR BLD: 0.69 K/UL (ref 0.1–0.95)
MONOCYTES NFR BLD: 9 % (ref 2–12)
NEUTROPHILS NFR BLD: 74 % (ref 43–80)
NEUTS SEG NFR BLD: 5.57 K/UL (ref 1.8–7.3)
PHOSPHATE SERPL-MCNC: 2.7 MG/DL (ref 2.5–4.5)
PLATELET # BLD AUTO: 220 K/UL (ref 130–450)
PMV BLD AUTO: 10.4 FL (ref 7–12)
POTASSIUM SERPL-SCNC: 3.9 MMOL/L (ref 3.5–5)
PROT SERPL-MCNC: 5.4 G/DL (ref 6.4–8.3)
RBC # BLD AUTO: 2.74 M/UL (ref 3.8–5.8)
SODIUM SERPL-SCNC: 138 MMOL/L (ref 132–146)
WBC OTHER # BLD: 7.5 K/UL (ref 4.5–11.5)

## 2024-06-03 PROCEDURE — 2580000003 HC RX 258: Performed by: INTERNAL MEDICINE

## 2024-06-03 PROCEDURE — 83735 ASSAY OF MAGNESIUM: CPT

## 2024-06-03 PROCEDURE — 85018 HEMOGLOBIN: CPT

## 2024-06-03 PROCEDURE — 6370000000 HC RX 637 (ALT 250 FOR IP): Performed by: STUDENT IN AN ORGANIZED HEALTH CARE EDUCATION/TRAINING PROGRAM

## 2024-06-03 PROCEDURE — 6360000002 HC RX W HCPCS: Performed by: INTERNAL MEDICINE

## 2024-06-03 PROCEDURE — 36415 COLL VENOUS BLD VENIPUNCTURE: CPT

## 2024-06-03 PROCEDURE — 2500000003 HC RX 250 WO HCPCS

## 2024-06-03 PROCEDURE — 6370000000 HC RX 637 (ALT 250 FOR IP): Performed by: INTERNAL MEDICINE

## 2024-06-03 PROCEDURE — 2060000000 HC ICU INTERMEDIATE R&B

## 2024-06-03 PROCEDURE — 80053 COMPREHEN METABOLIC PANEL: CPT

## 2024-06-03 PROCEDURE — 86901 BLOOD TYPING SEROLOGIC RH(D): CPT

## 2024-06-03 PROCEDURE — 84100 ASSAY OF PHOSPHORUS: CPT

## 2024-06-03 PROCEDURE — 86900 BLOOD TYPING SEROLOGIC ABO: CPT

## 2024-06-03 PROCEDURE — 86850 RBC ANTIBODY SCREEN: CPT

## 2024-06-03 PROCEDURE — 85025 COMPLETE CBC W/AUTO DIFF WBC: CPT

## 2024-06-03 PROCEDURE — 97161 PT EVAL LOW COMPLEX 20 MIN: CPT | Performed by: PHYSICAL THERAPIST

## 2024-06-03 PROCEDURE — 97530 THERAPEUTIC ACTIVITIES: CPT | Performed by: PHYSICAL THERAPIST

## 2024-06-03 PROCEDURE — 99291 CRITICAL CARE FIRST HOUR: CPT | Performed by: INTERNAL MEDICINE

## 2024-06-03 PROCEDURE — 85014 HEMATOCRIT: CPT

## 2024-06-03 RX ORDER — DEXMEDETOMIDINE HYDROCHLORIDE 4 UG/ML
.1-1.5 INJECTION, SOLUTION INTRAVENOUS CONTINUOUS
Status: DISCONTINUED | OUTPATIENT
Start: 2024-06-03 | End: 2024-06-03

## 2024-06-03 RX ORDER — HALOPERIDOL 5 MG/ML
1 INJECTION INTRAMUSCULAR EVERY 4 HOURS PRN
Status: DISCONTINUED | OUTPATIENT
Start: 2024-06-03 | End: 2024-06-05 | Stop reason: HOSPADM

## 2024-06-03 RX ORDER — QUETIAPINE FUMARATE 25 MG/1
25 TABLET, FILM COATED ORAL 2 TIMES DAILY
Status: DISCONTINUED | OUTPATIENT
Start: 2024-06-03 | End: 2024-06-05 | Stop reason: HOSPADM

## 2024-06-03 RX ORDER — MIRTAZAPINE 15 MG/1
15 TABLET, FILM COATED ORAL NIGHTLY
Status: DISCONTINUED | OUTPATIENT
Start: 2024-06-03 | End: 2024-06-05 | Stop reason: HOSPADM

## 2024-06-03 RX ADMIN — MIRTAZAPINE 15 MG: 15 TABLET, FILM COATED ORAL at 22:20

## 2024-06-03 RX ADMIN — SERTRALINE 25 MG: 50 TABLET, FILM COATED ORAL at 07:51

## 2024-06-03 RX ADMIN — QUETIAPINE FUMARATE 25 MG: 25 TABLET ORAL at 22:23

## 2024-06-03 RX ADMIN — Medication 0.2 MCG/KG/HR: at 01:48

## 2024-06-03 RX ADMIN — ACETAMINOPHEN 650 MG: 325 TABLET ORAL at 22:20

## 2024-06-03 RX ADMIN — Medication 100 MG: at 07:51

## 2024-06-03 RX ADMIN — HALOPERIDOL LACTATE 1 MG: 5 INJECTION, SOLUTION INTRAMUSCULAR at 18:40

## 2024-06-03 RX ADMIN — CYANOCOBALAMIN TAB 1000 MCG 1000 MCG: 1000 TAB at 07:50

## 2024-06-03 RX ADMIN — DONEPEZIL HYDROCHLORIDE 10 MG: 5 TABLET, FILM COATED ORAL at 22:20

## 2024-06-03 RX ADMIN — SALINE NASAL SPRAY 2 SPRAY: 1.5 SOLUTION NASAL at 07:52

## 2024-06-03 RX ADMIN — SODIUM CHLORIDE, POTASSIUM CHLORIDE, SODIUM LACTATE AND CALCIUM CHLORIDE: 600; 310; 30; 20 INJECTION, SOLUTION INTRAVENOUS at 08:42

## 2024-06-03 RX ADMIN — ACETAMINOPHEN 650 MG: 325 TABLET ORAL at 05:28

## 2024-06-03 RX ADMIN — APIXABAN 2.5 MG: 2.5 TABLET, FILM COATED ORAL at 07:50

## 2024-06-03 RX ADMIN — FOLIC ACID 1 MG: 1 TABLET ORAL at 07:49

## 2024-06-03 RX ADMIN — SALINE NASAL SPRAY 2 SPRAY: 1.5 SOLUTION NASAL at 13:24

## 2024-06-03 RX ADMIN — ONDANSETRON 4 MG: 2 INJECTION INTRAMUSCULAR; INTRAVENOUS at 08:04

## 2024-06-03 RX ADMIN — PANTOPRAZOLE SODIUM 40 MG: 40 TABLET, DELAYED RELEASE ORAL at 05:17

## 2024-06-03 RX ADMIN — SALINE NASAL SPRAY 2 SPRAY: 1.5 SOLUTION NASAL at 22:21

## 2024-06-03 RX ADMIN — PANTOPRAZOLE SODIUM 40 MG: 40 TABLET, DELAYED RELEASE ORAL at 15:41

## 2024-06-03 RX ADMIN — SODIUM CHLORIDE, PRESERVATIVE FREE 10 ML: 5 INJECTION INTRAVENOUS at 22:20

## 2024-06-03 RX ADMIN — SODIUM CHLORIDE, POTASSIUM CHLORIDE, SODIUM LACTATE AND CALCIUM CHLORIDE: 600; 310; 30; 20 INJECTION, SOLUTION INTRAVENOUS at 18:12

## 2024-06-03 RX ADMIN — QUETIAPINE FUMARATE 25 MG: 25 TABLET ORAL at 11:51

## 2024-06-03 RX ADMIN — SODIUM CHLORIDE, PRESERVATIVE FREE 10 ML: 5 INJECTION INTRAVENOUS at 07:51

## 2024-06-03 ASSESSMENT — PAIN SCALES - WONG BAKER
WONGBAKER_NUMERICALRESPONSE: NO HURT
WONGBAKER_NUMERICALRESPONSE: NO HURT

## 2024-06-03 ASSESSMENT — PAIN SCALES - PAIN ASSESSMENT IN ADVANCED DEMENTIA (PAINAD)
BODYLANGUAGE: RELAXED
CONSOLABILITY: NO NEED TO CONSOLE
TOTALSCORE: 0
BODYLANGUAGE: RELAXED
BREATHING: NORMAL
BREATHING: NORMAL
FACIALEXPRESSION: SMILING OR INEXPRESSIVE
TOTALSCORE: 3
TOTALSCORE: 0
FACIALEXPRESSION: SMILING OR INEXPRESSIVE
TOTALSCORE: 0
CONSOLABILITY: NO NEED TO CONSOLE
BODYLANGUAGE: RELAXED
BODYLANGUAGE: RELAXED
TOTALSCORE: 0
CONSOLABILITY: NO NEED TO CONSOLE
CONSOLABILITY: NO NEED TO CONSOLE
BREATHING: NORMAL
BREATHING: NORMAL
CONSOLABILITY: NO NEED TO CONSOLE
BODYLANGUAGE: RELAXED
BODYLANGUAGE: RELAXED
BREATHING: NORMAL
FACIALEXPRESSION: SMILING OR INEXPRESSIVE
BREATHING: NORMAL
CONSOLABILITY: NO NEED TO CONSOLE
BODYLANGUAGE: RELAXED
BREATHING: NORMAL
CONSOLABILITY: NO NEED TO CONSOLE
TOTALSCORE: 0
TOTALSCORE: 0
FACIALEXPRESSION: SMILING OR INEXPRESSIVE
TOTALSCORE: 0
CONSOLABILITY: NO NEED TO CONSOLE
CONSOLABILITY: NO NEED TO CONSOLE
FACIALEXPRESSION: SMILING OR INEXPRESSIVE
CONSOLABILITY: NO NEED TO CONSOLE
FACIALEXPRESSION: SMILING OR INEXPRESSIVE
BODYLANGUAGE: RELAXED
TOTALSCORE: 0
BODYLANGUAGE: RELAXED
BREATHING: NORMAL
FACIALEXPRESSION: SMILING OR INEXPRESSIVE
FACIALEXPRESSION: SMILING OR INEXPRESSIVE
BREATHING: NORMAL
NEGVOCALIZATION: OCCASIONAL MOAN/GROAN, LOW SPEECH, NEGATIVE/DISAPPROVING QUALITY
BREATHING: NORMAL
BODYLANGUAGE: RELAXED
CONSOLABILITY: NO NEED TO CONSOLE
FACIALEXPRESSION: SAD, FRIGHTENED, FROWN
FACIALEXPRESSION: SMILING OR INEXPRESSIVE
TOTALSCORE: 0
BODYLANGUAGE: TENSE, DISTRESSED PACING, FIDGETING
TOTALSCORE: 0
BODYLANGUAGE: RELAXED
TOTALSCORE: 0
CONSOLABILITY: NO NEED TO CONSOLE
FACIALEXPRESSION: SMILING OR INEXPRESSIVE
FACIALEXPRESSION: SMILING OR INEXPRESSIVE
BREATHING: NORMAL
BREATHING: NORMAL

## 2024-06-03 ASSESSMENT — PAIN DESCRIPTION - DESCRIPTORS: DESCRIPTORS: ACHING

## 2024-06-03 ASSESSMENT — PAIN DESCRIPTION - LOCATION: LOCATION: LEG

## 2024-06-03 ASSESSMENT — PAIN - FUNCTIONAL ASSESSMENT: PAIN_FUNCTIONAL_ASSESSMENT: ACTIVITIES ARE NOT PREVENTED

## 2024-06-03 ASSESSMENT — PAIN SCALES - GENERAL
PAINLEVEL_OUTOF10: 3

## 2024-06-03 ASSESSMENT — PAIN DESCRIPTION - ORIENTATION: ORIENTATION: RIGHT

## 2024-06-03 NOTE — PROGRESS NOTES
Assessment and Plan  Patient is a 73 y.o. male with the following medical Problems:   Upper GI bleeding was ruled out   Massive epistaxis  Acute blood loss anemia  Prior history of upper GI bleeding 3 years ago.  History of duodenal ulcer.  Chronic atrial fibrillation on Eliquis  Heart failure with preserved ejection fraction  Coronary artery disease (status post CABG)  Spinal stenosis  History of Alzheimer's dementia  Delirium    Plan of care:  Transfuse to keep hemoglobin above 7.  Close monitoring of blood pressure.  H&H every 12 hours.  GI consulted .  EGD revealed no GI source of bleeding  Protonix twice a day.  Maintenance IV fluid.  Precedex for agitation  Thiamine and folic acid  Switch medications to oral  Eliquis low-dose.  Remeron and Seroquel for agitation    History of Present Illness:    68 yo male with Hx of Afib (on coumadin), CHF (EF 55-60%, 8/2019), CAD sp CABG, spinal stenosis with prior laminectomies and prior C6 corpectomy who presented with Right hand weakness, myelopathy. MRI showed significant cord compression; taken to OR on 9/13 and had a C3-7 laminectomy, C2-T1 instrumented fusion.   Patient was admitted to [facility] with upper GI bleeding however GI services were not available and patient was transferred to [facility] for management.    Daily progress:  May 31, 2024: Patient has been confused and delirious overnight.  He was started on Precedex this morning.  Nasal packing was removed.  Hemoglobin continues to trend down.  Patient had melenic bowel movements yesterday.    June 1, 2024: Patient continues to be intermittently confused and agitated.  He underwent an EGD on May 31, 2024 which showed no evidence of GI bleeding.  Patient was transfused 1 unit of packed red blood cells for hemoglobin of 6.6 with good response.  Patient continues to be followed up by ENT.  Will continue to monitor his airway closely in the ICU since hemoglobin continues to trend down.   intact, no rashes   Neurologic: Alert and oriented x 1-2, confused and intermittently no focal deficit     Investigations:  Labs, radiological imaging and cardiac work up were personally reviewed        ICU STAFF PHYSICIAN NOTE OF PERSONAL INVOLVEMENT IN CARE  As the attending physician, I certify that I personally reviewed the patient's history and personally examined the patient to confirm the physical findings described above, and that I reviewed the relevant imaging studies and available reports.  I also discussed the differential diagnosis and all of the proposed management plans with the patient and individuals accompanying the patient to this visit.  They had the opportunity to ask questions about the proposed management plans and to have those questions answered.    This patient has a high probability of sudden, clinically significant deterioration, which requires the highest level of physician preparedness to intervene urgently.  I managed/supervised life or organ supporting interventions that required frequent physician assessment.  I devoted my full attention to the direct care of this patient for the amount of time indicated below.  Time I spent with family or surrogate(s) is included only if the patient was incapable of providing the necessary information or participating in medical decision-making.  Time devoted to teaching and to any procedures I billed separately is not included.  Critical Care Time: 31 minutes      Electronically signed by Sesar Huffman MD on 6/3/2024 at 11:12 AM

## 2024-06-03 NOTE — PROGRESS NOTES
Subjective:    The patient is seen examined, denies abdominal pain no nausea or vomiting , melena still present  no hematochezia, h/h stable for 3 days.    Objective:    /77   Pulse 73   Temp 98.1 °F (36.7 °C) (Axillary)   Resp 15   Ht 1.88 m (6' 2.02\")   Wt 106.9 kg (235 lb 11.2 oz)   SpO2 100%   BMI 30.25 kg/m²     Heart:  RRR, no murmurs, gallops, or rubs.  Lungs:  CTA bilaterally, no wheeze, rales or rhonchi  Abd: bowel sounds present, nontender, nondistended, no masses  Extrem:  No clubbing, cyanosis, or edema pulse present    CBC with Differential:    Lab Results   Component Value Date/Time    WBC 7.5 06/03/2024 03:58 AM    RBC 2.74 06/03/2024 03:58 AM    HGB 8.7 06/03/2024 12:37 PM    HCT 26.2 06/03/2024 12:37 PM     06/03/2024 03:58 AM    MCV 87.2 06/03/2024 03:58 AM    MCH 27.7 06/03/2024 03:58 AM    MCHC 31.8 06/03/2024 03:58 AM    RDW 15.6 06/03/2024 03:58 AM    LYMPHOPCT 15 06/03/2024 03:58 AM    MONOPCT 9 06/03/2024 03:58 AM    EOSPCT 1 06/03/2024 03:58 AM    BASOPCT 1 06/03/2024 03:58 AM    MONOSABS 0.69 06/03/2024 03:58 AM    EOSABS 0.09 06/03/2024 03:58 AM    BASOSABS 0.04 06/03/2024 03:58 AM        Assessment: nose bleed subsided                          Melena from nose bleed with h/h stable x 3 days argues that melena is from old bleed and not acute bleed and ent scope today revealed no recurrent bleed.  Egd negative for gi bleed and colonoscopy last month was normal besides small hemorrhoids and 3 hyperplastic small polyps that were removed.        Plan: ok to resume anticoagulation from gi standpoint             Ent to clear anticoagulation usage as patient had nose bleed not gi bleed.          Tucker Sewell MD  6:55 PM  6/3/2024

## 2024-06-03 NOTE — PROGRESS NOTES
Internal Medicine Consult Note    [facility]    Mustapha Sims D.O., JEOTonyI.                    Doug Keller D.O., CHRISTELLE Hemphill D.O.     Anabella Burgess, MSN, APRN, NP-C  Kevin Perez, MSN, APRN-CNP  Kj Carreon, MSN, APRN-CNP  Rosenda Ruiz, MSN APRN-CNP  Parul Hill, MSN. APRN-NP-C     Primary Care Physician: Joshua Vitale MD   Admitting Physician:  Mustapha Sims DO  Admission date and time: 5/30/2024 10:51 AM    Room:  [place] [address]  Admitting diagnosis: GI bleed [K92.2]    Patient Name: Faisal Pathak  MRN: [id number]    Date of Service: 6/3/2024     Subjective:  Faisal is a 73 y.o. male who was seen and examined today,6/3/2024, at the bedside.  Faisal remains pleasantly confused and is being fed breakfast by the bedside sitter.  Hemoglobin is being monitored closely in the setting of recently resumed Eliquis therapy.  Nursing reports to dark, melanotic, tarry stools yesterday and a small smear overnight.  He denies overt abdominal pain.  No family members were present during my examination.    Review of System:   Constitutional:   Pleasantly confused.  Admits to generalized malaise and fatigue.  HEENT:   Denies ear pain, sore throat, sinus or eye problems.  No additional nosebleeding.  Cardiovascular:   Denies any chest pain, irregular heartbeats, or palpitations.   Respiratory:   Denies shortness of breath, coughing, sputum production, hemoptysis, or wheezing.  Gastrointestinal:   Improving appetite with increased oral intake.  He is tolerating his diet.  Genitourinary:    Denies any urgency, frequency, hematuria. Voiding  without difficulty.  Extremities:   Denies lower extremity swelling, edema or cyanosis.   Neurology:    Denies any headache or focal neurological deficits, admits to generalized weakness and deconditioning.  Pleasantly confused  Musculoskeletal:    Denies  myalgias, joint complaints or back pain.    Integumentary:   Denies any rashes, ulcers, or excoriations.  Denies bruising.  Hematologic/Lymphatic:  Denies bruising or bleeding.    Physical Exam:  I/O this shift:  In: 120 [P.O.:120]  Out: 350 [Urine:350]    Intake/Output Summary (Last 24 hours) at 6/3/2024 0828  Last data filed at 6/3/2024 0800  Gross per 24 hour   Intake 3033.92 ml   Output 1150 ml   Net 1883.92 ml   I/O last 3 completed shifts:  In: 5125.1 [P.O.:540; I.V.:3614.5; IV Piggyback:970.6]  Out: 2650 [Urine:2650]  Patient Vitals for the past 96 hrs (Last 3 readings):   Weight   06/03/24 0100 106.9 kg (235 lb 11.2 oz)   06/02/24 0000 107.5 kg (237 lb 1.6 oz)   06/01/24 0420 105.7 kg (233 lb 1.6 oz)     Vital Signs:   Blood pressure (!) 148/75, pulse 70, temperature 97.8 °F (36.6 °C), temperature source Oral, resp. rate 20, height 1.88 m (6' 2.02\"), weight 106.9 kg (235 lb 11.2 oz), SpO2 100 %.    General appearance:  Pleasantly confused.  I suspect he is nearing his baseline.  Head:  Normocephalic. No masses, lesions or tenderness.  Eyes:  PERRLA.  EOMI.  Sclera clear.  Buccal mucosa moist.  ENT:  Ears normal. Mucosa normal.  No additional nosebleeding.  Neck:    Supple. Trachea midline. No thyromegaly. No JVD. No bruits.  Heart:    Regular rhythm.  1/6 murmurs.  Lungs:    Symmetrical. Clear to auscultation bilaterally.  No wheezes. No rhonchi. No rales.  Abdomen:   Soft. Non-tender. Non-distended. Bowel sounds positive. No organomegaly or masses.  No pain on palpation.  Extremities:    Peripheral pulses present.  No peripheral edema.  No ulcers. No cyanosis. No clubbing.  Neurologic:    Oriented to person.  Not oriented to place or time.  Obvious signs of dementia.  No focal neurologic deficits.  Musculoskeletal:   Spine ROM normal. Muscular strength intact. Gait not assessed.  Integumentary:  No rashes  Skin normal color and texture.  Genitalia/Breast:  Deferred    Medication:  Scheduled Meds:   Naltrexone HCl (Pain)  3 mg Oral Daily    apixaban

## 2024-06-03 NOTE — PROGRESS NOTES
Cardiology  Progress Note      SUBJECTIVE:  Pleasantly confused.  No acute distress.  He ate breakfast yesterday as per the sitter who was at bedside when I came to see the patient.    Current Inpatient Medications  Current Facility-Administered Medications: dexmedeTOMIDine (PRECEDEX) 400 mcg in sodium chloride 0.9 % 100 mL infusion, 0.1-1.5 mcg/kg/hr, IntraVENous, Continuous  Naltrexone HCl (Patient supplied medication) CAPS 3 mg per compounded capsule (Patient Supplied), 3 mg, Oral, Daily  apixaban (ELIQUIS) tablet 2.5 mg, 2.5 mg, Oral, BID  donepezil (ARICEPT) tablet 10 mg, 10 mg, Oral, Nightly  sertraline (ZOLOFT) tablet 25 mg, 25 mg, Oral, Daily  folic acid (FOLVITE) tablet 1 mg, 1 mg, Oral, Daily  thiamine mononitrate tablet 100 mg, 100 mg, Oral, Daily  vitamin B-12 (CYANOCOBALAMIN) tablet 1,000 mcg, 1,000 mcg, Oral, Daily  pantoprazole (PROTONIX) tablet 40 mg, 40 mg, Oral, BID AC  sodium chloride (OCEAN, BABY AYR) 0.65 % nasal spray 2 spray, 2 spray, Each Nostril, TID  vitamin D (ERGOCALCIFEROL) capsule 50,000 Units, 50,000 Units, Oral, Weekly  sodium chloride flush 0.9 % injection 5-40 mL, 5-40 mL, IntraVENous, 2 times per day  sodium chloride flush 0.9 % injection 5-40 mL, 5-40 mL, IntraVENous, PRN  0.9 % sodium chloride infusion, , IntraVENous, PRN  potassium chloride 20 mEq/50 mL IVPB (Central Line), 20 mEq, IntraVENous, PRN **OR** potassium chloride 10 mEq/100 mL IVPB (Peripheral Line), 10 mEq, IntraVENous, PRN  magnesium sulfate 2000 mg in 50 mL IVPB premix, 2,000 mg, IntraVENous, PRN  ondansetron (ZOFRAN-ODT) disintegrating tablet 4 mg, 4 mg, Oral, Q8H PRN **OR** ondansetron (ZOFRAN) injection 4 mg, 4 mg, IntraVENous, Q6H PRN  polyethylene glycol (GLYCOLAX) packet 17 g, 17 g, Oral, Daily PRN  acetaminophen (TYLENOL) tablet 650 mg, 650 mg, Oral, Q6H PRN **OR** acetaminophen (TYLENOL) suppository 650 mg, 650 mg, Rectal, Q6H PRN  lactated ringers IV soln infusion, , IntraVENous,  post CABG in the past.  Nurse no complains of chest pain.  EKG is nondiagnostic due to the presence of basement activity.  Patient had minimal insignificant study troponin elevation at 22 with no trending troponin elevation which usually goes against acute ischemic event.  Patient has no complains of chest pain.    3.status post permanent pacemaker for sick sinus syndrome with symptomatic bradycardia in the past.              Electronically signed by Kennedy St MD on 6/3/2024 at 9:08 AM

## 2024-06-03 NOTE — CARE COORDINATION
6-3-Cm note: pt sitting up in chair at this time, his sister in law is at the bedside, she cares for him since pt's wife passed away, pt has dementia, JENARO states she will take him home at dc with no needs including home care, states she has all needed equiptment. States pt will be back to normal mentation when she gets him home. Cm following. Electronically signed by Sharita Biswas RN on 6/3/2024 at 3:09 PM

## 2024-06-03 NOTE — PROGRESS NOTES
evaluation (69087)  Therapeutic activities (12037)   10 minutes  1 unit(s)  No treatment billed    Quique Fisher, PT

## 2024-06-03 NOTE — PLAN OF CARE
coping strategies  Description: INTERVENTIONS:  1. Assist patient/family to identify coping skills, available support systems and cultural and spiritual values  2. Provide emotional support, including active listening and acknowledgement of concerns of patient and caregivers  3. Reduce environmental stimuli, as able  4. Instruct patient/family in relaxation techniques, as appropriate  5. Assess for spiritual pain/suffering and initiate Spiritual Care, Psychosocial Clinical Specialist consults as needed  Outcome: Progressing     Problem: Confusion  Goal: Confusion, delirium, dementia, or psychosis is improved or at baseline  Description: INTERVENTIONS:  1. Assess for possible contributors to thought disturbance, including medications, impaired vision or hearing, underlying metabolic abnormalities, dehydration, psychiatric diagnoses, and notify attending LIP  2. [place] high risk fall precautions, as indicated  3. Provide frequent short contacts to provide reality reorientation, refocusing and direction  4. Decrease environmental stimuli, including noise as appropriate  5. Monitor and intervene to maintain adequate nutrition, hydration, elimination, sleep and activity  6. If unable to ensure safety without constant attention obtain sitter and review sitter guidelines with assigned personnel  7. Initiate Psychosocial CNS and Spiritual Care consult, as indicated  Outcome: Progressing     Problem: Pain  Goal: Verbalizes/displays adequate comfort level or baseline comfort level  Outcome: Progressing     Problem: Nutrition Deficit:  Goal: Optimize nutritional status  Outcome: Progressing

## 2024-06-03 NOTE — PROGRESS NOTES
ENT called to the bedside due to persistently low Hgb and desire to rule out nasal source of bleeding. Continues to have melena. Underwent EGD but no colonoscopy. Patient has not had any bleeding from his nose either anteriorly or posteriorly since his packing was pulled several days ago. Patient denies swallowing or coughing up blood. Flexible nasopharyngoscopy was performed to evaluate bilateral nasal cavities and nasopharynx and there was no identifiable source of bleeding - see images below. Continue nasal saline TID. If nosebleed develops, spray Afrin, hold pressure and notify ENT. No further intervention from ENT standpoint will be signing off.     Electronically signed by Jacobo Fong DO on 6/3/2024 at 1:41 PM

## 2024-06-03 NOTE — PLAN OF CARE
Problem: Discharge Planning  Goal: Discharge to home or other facility with appropriate resources  6/3/2024 0824 by Harriet Roth RN  Outcome: Progressing  Flowsheets (Taken 6/3/2024 0824)  Discharge to home or other facility with appropriate resources:   Identify barriers to discharge with patient and caregiver   Identify discharge learning needs (meds, wound care, etc)  6/3/2024 0110 by Shama Lopez RN  Outcome: Progressing     Problem: Skin/Tissue Integrity  Goal: Absence of new skin breakdown  Description: 1.  Monitor for areas of redness and/or skin breakdown  2.  Assess vascular access sites hourly  3.  Every 4-6 hours minimum:  Change oxygen saturation probe site  4.  Every 4-6 hours:  If on nasal continuous positive airway pressure, respiratory therapy assess nares and determine need for appliance change or resting period.  6/3/2024 0824 by Harriet Roth RN  Outcome: Progressing  6/3/2024 0110 by Shama Lopez RN  Outcome: Progressing     Problem: ABCDS Injury Assessment  Goal: Absence of physical injury  6/3/2024 0824 by Harriet Roth RN  Outcome: Progressing  Flowsheets (Taken 6/3/2024 0824)  Absence of Physical Injury: Implement safety measures based on patient assessment  6/3/2024 0110 by Shama Lopez RN  Outcome: Progressing     Problem: Safety - Adult  Goal: Free from fall injury  6/3/2024 0824 by Harriet Roth RN  Outcome: Progressing  Flowsheets (Taken 6/3/2024 0824)  Free From Fall Injury: Instruct family/caregiver on patient safety  6/3/2024 0110 by Shama Lopez RN  Outcome: Progressing     Problem: Neurosensory - Adult  Goal: Achieves maximal functionality and self care  6/3/2024 0824 by Harriet Roth RN  Outcome: Progressing  Flowsheets (Taken 6/3/2024 0824)  Achieves maximal functionality and self care: Monitor swallowing and airway patency with patient fatigue and changes in neurological status  6/3/2024 0110 by Shama Lopez RN  Outcome: Progressing     Problem:

## 2024-06-03 NOTE — PROGRESS NOTES
06/03/24 1057   Encounter Summary   Encounter Overview/Reason Spiritual/Emotional Needs   Service Provided For Patient and family together   Referral/Consult From Rounding   Support System Family members   Last Encounter  06/03/24  (L-HD)   Complexity of Encounter Moderate   Spiritual/Emotional needs   Type Spiritual Support   Assessment/Intervention/Outcome   Assessment Calm   Intervention Active listening;Discussed belief system/[place]ah[place] practices/sergio;Discussed illness injury and it’s impact;Explored/Affirmed feelings, thoughts, concerns;Explored Coping Skills/Resources;Nurtured Hope;Sustaining Presence/Ministry of presence   Outcome Acceptance;Expressed feelings, needs, and concerns;Engaged in conversation;Receptive;Expressed Gratitude      visited with patient, sister in law, with sitter present.  offered a listening presence and nurtured hope. Patient and sister in law were grateful for their visit.    Spiritual care is ongoing and as needed.    Chaplain Hernandez Keerthi

## 2024-06-04 LAB
ALBUMIN SERPL-MCNC: 3.1 G/DL (ref 3.5–5.2)
ALP SERPL-CCNC: 78 U/L (ref 40–129)
ALT SERPL-CCNC: 14 U/L (ref 0–40)
ANION GAP SERPL CALCULATED.3IONS-SCNC: 8 MMOL/L (ref 7–16)
AST SERPL-CCNC: 21 U/L (ref 0–39)
BASOPHILS # BLD: 0.04 K/UL (ref 0–0.2)
BASOPHILS NFR BLD: 1 % (ref 0–2)
BILIRUB SERPL-MCNC: 0.4 MG/DL (ref 0–1.2)
BUN SERPL-MCNC: 8 MG/DL (ref 6–23)
CALCIUM SERPL-MCNC: 8.3 MG/DL (ref 8.6–10.2)
CHLORIDE SERPL-SCNC: 105 MMOL/L (ref 98–107)
CO2 SERPL-SCNC: 27 MMOL/L (ref 22–29)
CREAT SERPL-MCNC: 0.8 MG/DL (ref 0.7–1.2)
EOSINOPHIL # BLD: 0.15 K/UL (ref 0.05–0.5)
EOSINOPHILS RELATIVE PERCENT: 2 % (ref 0–6)
ERYTHROCYTE [DISTWIDTH] IN BLOOD BY AUTOMATED COUNT: 16.8 % (ref 11.5–15)
GFR, ESTIMATED: >90 ML/MIN/1.73M2
GLUCOSE SERPL-MCNC: 95 MG/DL (ref 74–99)
HCT VFR BLD AUTO: 25.8 % (ref 37–54)
HCT VFR BLD AUTO: 26.3 % (ref 37–54)
HGB BLD-MCNC: 7.9 G/DL (ref 12.5–16.5)
HGB BLD-MCNC: 8.2 G/DL (ref 12.5–16.5)
IMM GRANULOCYTES # BLD AUTO: 0.03 K/UL (ref 0–0.58)
IMM GRANULOCYTES NFR BLD: 1 % (ref 0–5)
LYMPHOCYTES NFR BLD: 1.6 K/UL (ref 1.5–4)
LYMPHOCYTES RELATIVE PERCENT: 24 % (ref 20–42)
MAGNESIUM SERPL-MCNC: 1.9 MG/DL (ref 1.6–2.6)
MCH RBC QN AUTO: 28.1 PG (ref 26–35)
MCHC RBC AUTO-ENTMCNC: 31.2 G/DL (ref 32–34.5)
MCV RBC AUTO: 90.1 FL (ref 80–99.9)
MONOCYTES NFR BLD: 0.56 K/UL (ref 0.1–0.95)
MONOCYTES NFR BLD: 8 % (ref 2–12)
NEUTROPHILS NFR BLD: 64 % (ref 43–80)
NEUTS SEG NFR BLD: 4.28 K/UL (ref 1.8–7.3)
PHOSPHATE SERPL-MCNC: 3.1 MG/DL (ref 2.5–4.5)
PLATELET # BLD AUTO: 252 K/UL (ref 130–450)
PMV BLD AUTO: 10.2 FL (ref 7–12)
POTASSIUM SERPL-SCNC: 3.7 MMOL/L (ref 3.5–5)
PROT SERPL-MCNC: 5.6 G/DL (ref 6.4–8.3)
RBC # BLD AUTO: 2.92 M/UL (ref 3.8–5.8)
SODIUM SERPL-SCNC: 140 MMOL/L (ref 132–146)
WBC OTHER # BLD: 6.7 K/UL (ref 4.5–11.5)

## 2024-06-04 PROCEDURE — 83735 ASSAY OF MAGNESIUM: CPT

## 2024-06-04 PROCEDURE — 36415 COLL VENOUS BLD VENIPUNCTURE: CPT

## 2024-06-04 PROCEDURE — 97110 THERAPEUTIC EXERCISES: CPT

## 2024-06-04 PROCEDURE — 2060000000 HC ICU INTERMEDIATE R&B

## 2024-06-04 PROCEDURE — 84100 ASSAY OF PHOSPHORUS: CPT

## 2024-06-04 PROCEDURE — 97535 SELF CARE MNGMENT TRAINING: CPT

## 2024-06-04 PROCEDURE — 85014 HEMATOCRIT: CPT

## 2024-06-04 PROCEDURE — 85018 HEMOGLOBIN: CPT

## 2024-06-04 PROCEDURE — 6370000000 HC RX 637 (ALT 250 FOR IP): Performed by: INTERNAL MEDICINE

## 2024-06-04 PROCEDURE — 6360000002 HC RX W HCPCS: Performed by: INTERNAL MEDICINE

## 2024-06-04 PROCEDURE — 97165 OT EVAL LOW COMPLEX 30 MIN: CPT

## 2024-06-04 PROCEDURE — 85025 COMPLETE CBC W/AUTO DIFF WBC: CPT

## 2024-06-04 PROCEDURE — 80053 COMPREHEN METABOLIC PANEL: CPT

## 2024-06-04 PROCEDURE — 2580000003 HC RX 258: Performed by: INTERNAL MEDICINE

## 2024-06-04 RX ADMIN — APIXABAN 2.5 MG: 2.5 TABLET, FILM COATED ORAL at 21:37

## 2024-06-04 RX ADMIN — CYANOCOBALAMIN TAB 1000 MCG 1000 MCG: 1000 TAB at 10:48

## 2024-06-04 RX ADMIN — DONEPEZIL HYDROCHLORIDE 10 MG: 5 TABLET, FILM COATED ORAL at 21:37

## 2024-06-04 RX ADMIN — FOLIC ACID 1 MG: 1 TABLET ORAL at 10:48

## 2024-06-04 RX ADMIN — HALOPERIDOL LACTATE 1 MG: 5 INJECTION, SOLUTION INTRAMUSCULAR at 18:16

## 2024-06-04 RX ADMIN — PANTOPRAZOLE SODIUM 40 MG: 40 TABLET, DELAYED RELEASE ORAL at 05:50

## 2024-06-04 RX ADMIN — SODIUM CHLORIDE, PRESERVATIVE FREE 10 ML: 5 INJECTION INTRAVENOUS at 21:41

## 2024-06-04 RX ADMIN — QUETIAPINE FUMARATE 25 MG: 25 TABLET ORAL at 10:48

## 2024-06-04 RX ADMIN — PANTOPRAZOLE SODIUM 40 MG: 40 TABLET, DELAYED RELEASE ORAL at 17:00

## 2024-06-04 RX ADMIN — SALINE NASAL SPRAY 2 SPRAY: 1.5 SOLUTION NASAL at 10:48

## 2024-06-04 RX ADMIN — SODIUM CHLORIDE, POTASSIUM CHLORIDE, SODIUM LACTATE AND CALCIUM CHLORIDE: 600; 310; 30; 20 INJECTION, SOLUTION INTRAVENOUS at 08:25

## 2024-06-04 RX ADMIN — Medication 100 MG: at 10:48

## 2024-06-04 RX ADMIN — MIRTAZAPINE 15 MG: 15 TABLET, FILM COATED ORAL at 21:37

## 2024-06-04 RX ADMIN — SALINE NASAL SPRAY 2 SPRAY: 1.5 SOLUTION NASAL at 21:41

## 2024-06-04 RX ADMIN — SERTRALINE 25 MG: 50 TABLET, FILM COATED ORAL at 10:48

## 2024-06-04 RX ADMIN — QUETIAPINE FUMARATE 25 MG: 25 TABLET ORAL at 21:37

## 2024-06-04 ASSESSMENT — PAIN SCALES - PAIN ASSESSMENT IN ADVANCED DEMENTIA (PAINAD)
FACIALEXPRESSION: SMILING OR INEXPRESSIVE
BODYLANGUAGE: RELAXED
TOTALSCORE: 0
BREATHING: NORMAL
BODYLANGUAGE: RELAXED
TOTALSCORE: 0
CONSOLABILITY: NO NEED TO CONSOLE
TOTALSCORE: 0
BREATHING: NORMAL
CONSOLABILITY: NO NEED TO CONSOLE
BREATHING: NORMAL
BODYLANGUAGE: RELAXED
CONSOLABILITY: NO NEED TO CONSOLE

## 2024-06-04 ASSESSMENT — PAIN SCALES - GENERAL
PAINLEVEL_OUTOF10: 0

## 2024-06-04 NOTE — PROGRESS NOTES
OCCUPATIONAL THERAPY INITIAL EVALUATION    [facility]  [address] [place]. [place]        Date:2024                                                  Patient Name: Faisal Pathak    MRN: [id number]    : 1950    Room: [address] [place] [address]      Evaluating OT: Alok Rodriguez OTR/L; #[id number]     Referring Provider and Specific Provider Orders/Date:      24  OT eval and treat  Start:  24,   End:  24,   ONE TIME,   Standing Count:  1 Occurrences,   R         Sesar Huffman MD      Placement Recommendation: Subacute Rehab       Diagnosis:   No diagnosis found.     Surgery: none      Pertinent Medical History:       Past Medical History:   Diagnosis Date    Atrial fibrillation (HCC)     Bleeding tendency (HCC)     Dementia (HCC)     Epistaxis 2024    Hand pain     Heart attack (HCC) 2004    History of blood transfusion     Hyperlipidemia     Numbness     Sick sinus syndrome (HCC)          Past Surgical History:   Procedure Laterality Date    CERVICAL FUSION      Dr. Joseph / [facility]     CERVICAL FUSION  2019    multilevel posterior @ [facility]    CORONARY ARTERY BYPASS GRAFT  2004    ENDOSCOPY, COLON, DIAGNOSTIC  2024    by Dr. Massey    PACEMAKER PLACEMENT      UPPER GASTROINTESTINAL ENDOSCOPY N/A 2024    ESOPHAGOGASTRODUODENOSCOPY performed by Tucker Sewell MD at [facility] ENDOSCOPY        Precautions:  Up as tolerated, falls, alarm, and dementia ,      Assessment of current deficits:     [x] Functional mobility  [x]ADLs  [x] Strength               []Cognition    [x] Functional transfers   [x] IADLs         [] Safety Awareness   [x]Endurance    [] Fine Coordination              [x] Balance      [] Vision/perception   []Sensation     []Gross Motor Coordination  [] ROM  [] Delirium                   [] Motor Control     OT PLAN OF CARE   OT POC based on physician orders, patient  diagnosis and results of clinical assessment    Frequency/Duration 1-3 days/wk for 2 weeks PRN     Specific OT Treatment Interventions to include:   * Instruction/training on adapted ADL techniques and AE recommendations to increase functional independence within precautions       * Training on energy conservation strategies, correct breathing pattern and techniques to improve independence/tolerance for self-care routine  * Functional transfer/mobility training/DME recommendations for increased independence, safety, and fall prevention  * Therapeutic exercise to improve motor endurance, ROM, and functional strength for ADLs/functional transfers  * Therapeutic activities to facilitate/challenge dynamic balance, stand tolerance for increased safety and independence with ADLs    Recommended Adaptive Equipment: TBD at Rehab    Home Living: Patient lives with family in a ranch home  with 3 steps, with rail  to enter home.  Patient reports walk-in shower     Equipment owned: Cane and Wheeled Walker,       Prior Level of Function: Assistance from family with ADLs , Assistance from family with IADLs; Poor historian; Unknown ambulation status        Pain Level: 0/10 pain reported  Cognition: A&O: 1/4; name only difficulty following one step directions   Memory: fair -   Sequencing: poor+   Problem solving: poor+   Judgement/safety: poor+    [facility]       AM-PAC Daily Activity - Inpatient   How much help is needed for putting on and taking off regular lower body clothing?: A Lot  How much help is needed for bathing (which includes washing, rinsing, drying)?: A Lot  How much help is needed for toileting (which includes using toilet, bedpan, or urinal)?: A Lot  How much help is needed for putting on and taking off regular upper body clothing?: A Little  How much help is needed for taking care of personal grooming?: A Little  How much help for eating meals?: A Little  AM-[facility] Inpatient Daily Activity Raw Score: 15  AM-PAC Inpatient

## 2024-06-04 NOTE — CARE COORDINATION
24 1143 CM note: no covid testing. IVF stopped this am and his eliquis was restarted. Room air. Hx dementia, afib- on eliquis, SSS. 1:1 sitter. Tx from ICU 6/3/24. Hgb 6.6 on , 1 unit PRBC given, last hgb 8.2. EGD (-) for bleed. Discharge plan is home. Pt's wife  a couple of years ago and his sister-in-law Esperanza moved in with pt to care for him. He has 24/7 care from family and they decline HHC or any other needs. States pt will be back to normal mentation when he gets home. His DME includes a ww, cane, and an adustable bed. Family will provide transport home on dc. CM will follow. Electronically signed by Maryanne Demarco RN on 2024 at 12:02 PM

## 2024-06-04 NOTE — PROGRESS NOTES
Internal Medicine Consult Note    [facility]PH[facility]    Mustapha Sims D.O., JEOTonyI.                    Doug Keller D.O., JEOTonyITony Hemphill D.O.     Anabella Burgess, MSN, APRN, NP-C  Kevin Perez, MSN, APRN-CNP  Kj Carreon, MSN, APRN-CNP  Rosenda Ruiz, MSN APRN-CNP  Parul Hill, MSN. APRN-NP-C     Primary Care Physician: Joshua Vitale MD   Admitting Physician:  Mustapha Sims DO  Admission date and time: 5/30/2024 10:51 AM    Room:  [address] [place] [address]  Admitting diagnosis: GI bleed [K92.2]    Patient Name: Faisal Pathak  MRN: [id number]    Date of Service: 6/4/2024     Subjective:  Faisal is a 73 y.o. male who was seen and examined today,6/4/2024, at the bedside.  Faisal has been transferred from the intensive care unit and is resting comfortably on the [facility] floor.  He is seated at the bedside chair working with the therapy teams.  He remains pleasantly confused with known dementia.  He had 1 tarry bowel movement today but is otherwise tolerating a diet and resting comfortably.  We discussed resumption of anticoagulation therapy this afternoon if afternoon hemoglobin remained stable.  Ultimate plan remains for return home where he lives with his sister-in-law.    Review of System:   Constitutional:   Pleasantly confused.  Improving malaise and fatigue.  HEENT:   Denies ear pain, sore throat, sinus or eye problems.  Nosebleeding has entirely resolved.  Cardiovascular:   Denies any chest pain, irregular heartbeats, or palpitations.   Respiratory:   Denies shortness of breath, coughing, sputum production, hemoptysis, or wheezing.  Gastrointestinal:   Improving appetite with increased oral intake.  He is tolerating his diet.  Genitourinary:    Denies any urgency, frequency, hematuria. Voiding  without difficulty.  Extremities:   Denies lower extremity swelling, edema or cyanosis.   Neurology:    Denies any headache or focal neurological deficits,  texture.  Genitalia/Breast:  Deferred    Medication:  Scheduled Meds:   QUEtiapine  25 mg Oral BID    mirtazapine  15 mg Oral Nightly    Naltrexone HCl (Pain)  3 mg Oral Daily    apixaban  2.5 mg Oral BID    donepezil  10 mg Oral Nightly    sertraline  25 mg Oral Daily    folic acid  1 mg Oral Daily    thiamine mononitrate  100 mg Oral Daily    vitamin B-12  1,000 mcg Oral Daily    pantoprazole  40 mg Oral BID AC    sodium chloride  2 spray Each Nostril TID    vitamin D  50,000 Units Oral Weekly    sodium chloride flush  5-40 mL IntraVENous 2 times per day     Continuous Infusions:   sodium chloride         Objective Data:  Recent Labs     06/02/24  0459 06/03/24  0358 06/03/24  1237 06/04/24  0758   WBC 8.2 7.5  --  6.7   RBC 2.94* 2.74*  --  2.92*   HGB 8.3* 7.6* 8.7* 8.2*   HCT 25.3* 23.9* 26.2* 26.3*   MCV 86.1 87.2  --  90.1   MCH 28.2 27.7  --  28.1   MCHC 32.8 31.8*  --  31.2*   RDW 14.6 15.6*  --  16.8*    220  --  252   MPV 10.3 10.4  --  10.2     Recent Labs     06/02/24  0459 06/03/24  0358 06/04/24  0758    138 140   K 3.6 3.9 3.7    105 105   CO2 24 25 27   BUN 11 8 8   CREATININE 0.8 0.8 0.8   GLUCOSE 101* 113* 95   CALCIUM 8.4* 8.1* 8.3*   BILITOT 0.6 0.4 0.4   ALKPHOS 84 77 78   AST 22 16 21   ALT 19 14 14   ALBUMIN 3.0* 2.8* 3.1*         Assessment:  Severe epistaxis resulting in blood loss anemia  Hospital-acquired delirium on top of Alzheimer's dementia chronically on Aricept  Coronary artery disease, status post coronary artery bypass grafting.  Atrial fibrillation on Eliquis therapy chronically  History of gastroesophageal reflux disease.  Essential hypertension.  History of hyperlipidemia, on Pravachol.  Overweight with elevated body mass index of 29.94.  Current use of nicotine  Vitamin deficiencies including vitamin B12/vitamin D      Plan:   If afternoon hemoglobin remained stable, low-dose Eliquis will be resumed this afternoon.  He is otherwise tolerating a diet and

## 2024-06-04 NOTE — PROGRESS NOTES
stabilization to prevent fall   -Gait: Gait training and Standing activities to improve: base of support, weight shift, weight bearing    -Endurance: Utilize Supervised activities to increase level of endurance to allow for safe functional mobility including transfers and gait   -Stairs: Stair training with instruction on proper technique and hand placement on rail    PT long term treatment goals are located in below grid    Patient and or family understand(s) diagnosis, prognosis, and plan of care.    Frequency of treatments: Patient will be seen  daily.         Prior Level of Function: Patient ambulated with wheeled walker    Rehab Potential: good   for baseline    Past medical history:   Past Medical History:   Diagnosis Date    Atrial fibrillation (HCC)     Bleeding tendency (HCC)     Dementia (HCC)     Epistaxis 05/30/2024    Hand pain     Heart attack (HCC) 2004    History of blood transfusion     Hyperlipidemia     Numbness     Sick sinus syndrome (HCC)      Past Surgical History:   Procedure Laterality Date    CERVICAL FUSION  2003    Dr. Joseph / [facility]     CERVICAL FUSION  09/2019    multilevel posterior @ [facility]    CORONARY ARTERY BYPASS GRAFT  2004    ENDOSCOPY, COLON, DIAGNOSTIC  05/31/2024    by Dr. Massey    PACEMAKER PLACEMENT      UPPER GASTROINTESTINAL ENDOSCOPY N/A 5/31/2024    ESOPHAGOGASTRODUODENOSCOPY performed by Tucker Sewell MD at [facility] ENDOSCOPY       SUBJECTIVE:    Precautions: Up as tolerated, falls, alarm, and dementia ,      Social history: Patient lives with family in a ranch home  with 3 steps, with rail  to enter home.        Equipment owned: Cane and Wheeled Walker,       AM-PAC Basic Mobility        AM-PAC Basic Mobility - Inpatient   How much help is needed turning from your back to your side while in a flat bed without using bedrails?: A Lot  How much help is needed moving from lying on your back to sitting on the side of a flat bed without using bedrails?:    Dynamic Standing:  fair minus with wheeled walker  Sitting EOB: not assessed   Dynamic Standing: not assessed    Sitting EOB:  good    Dynamic Standing: good with wheeled walker      Patient is Alert & Oriented x person and does not follow directions  hard of hearing   Sensation:  Patient  denies numbness/tingling   Edema:  no   Endurance: fair       Vitals: room air   Blood Pressure at rest  Blood Pressure during session    /Heart Rate at rest  Heart Rate during session    SPO2 at res%  SPO2 during session %     Patient education  Patient educated on role of Physical Therapy, risks of immobility, safety and plan of care, importance of positional changes for oxygen exchange,  importance of mobility while in hospital , and safety      Patient response to education:   Pt verbalized understanding Pt demonstrated skill Pt requires further education in this area   Yes Partial Yes      Treatment:  Patient practiced and was instructed/facilitated in the following treatment: Patient    already in chair.  Performed seated exercise. Patient requires increased time and is fixated on fixing things within the room.      Therapeutic Exercises:  ankle pumps, long arc quad, shoulder internal and external rotation, and elbow flexion/extension x 8 reps.          At end of session, patient in chair with alarm call light and phone within reach,  all lines and tubes intact, nursing notified.      Patient would benefit from continued skilled Physical Therapy to improve functional independence and quality of life.         Patient's/ family goals   home    Time in  959  Time out 1011    Total Treatment Time 12 minutes    CPT codes:    Therapeutic exercises (30797)   8 minutes  1 unit(s)  Non billable time 4 minutes    Ivory Bronson PTA  lic#[id number]   17-Jun-2022 18:25

## 2024-06-04 NOTE — PROGRESS NOTES
Cardiology  Progress Note      SUBJECTIVE:  Flat in bed.   Presently confused.  Sitter is at bedside.  No acute distress.    Current Inpatient Medications  Current Facility-Administered Medications: QUEtiapine (SEROQUEL) tablet 25 mg, 25 mg, Oral, BID  mirtazapine (REMERON) tablet 15 mg, 15 mg, Oral, Nightly  haloperidol lactate (HALDOL) injection 1 mg, 1 mg, IntraVENous, Q4H PRN  Naltrexone HCl (Patient supplied medication) CAPS 3 mg per compounded capsule (Patient Supplied), 3 mg, Oral, Daily  [Held by provider] apixaban (ELIQUIS) tablet 2.5 mg, 2.5 mg, Oral, BID  donepezil (ARICEPT) tablet 10 mg, 10 mg, Oral, Nightly  sertraline (ZOLOFT) tablet 25 mg, 25 mg, Oral, Daily  folic acid (FOLVITE) tablet 1 mg, 1 mg, Oral, Daily  thiamine mononitrate tablet 100 mg, 100 mg, Oral, Daily  vitamin B-12 (CYANOCOBALAMIN) tablet 1,000 mcg, 1,000 mcg, Oral, Daily  pantoprazole (PROTONIX) tablet 40 mg, 40 mg, Oral, BID AC  sodium chloride (OCEAN, BABY AYR) 0.65 % nasal spray 2 spray, 2 spray, Each Nostril, TID  vitamin D (ERGOCALCIFEROL) capsule 50,000 Units, 50,000 Units, Oral, Weekly  sodium chloride flush 0.9 % injection 5-40 mL, 5-40 mL, IntraVENous, 2 times per day  sodium chloride flush 0.9 % injection 5-40 mL, 5-40 mL, IntraVENous, PRN  0.9 % sodium chloride infusion, , IntraVENous, PRN  potassium chloride 20 mEq/50 mL IVPB (Central Line), 20 mEq, IntraVENous, PRN **OR** potassium chloride 10 mEq/100 mL IVPB (Peripheral Line), 10 mEq, IntraVENous, PRN  magnesium sulfate 2000 mg in 50 mL IVPB premix, 2,000 mg, IntraVENous, PRN  ondansetron (ZOFRAN-ODT) disintegrating tablet 4 mg, 4 mg, Oral, Q8H PRN **OR** ondansetron (ZOFRAN) injection 4 mg, 4 mg, IntraVENous, Q6H PRN  polyethylene glycol (GLYCOLAX) packet 17 g, 17 g, Oral, Daily PRN  acetaminophen (TYLENOL) tablet 650 mg, 650 mg, Oral, Q6H PRN **OR** acetaminophen (TYLENOL) suppository 650 mg, 650 mg, Rectal, Q6H PRN  lactated ringers IV soln infusion, , IntraVENous,  Continuous      Physical  VITALS:  BP (!) 141/71   Pulse 71   Temp 98.8 °F (37.1 °C) (Oral)   Resp 24   Ht 1.88 m (6' 2.02\")   Wt 106.9 kg (235 lb 11.2 oz)   SpO2 96%   BMI 30.25 kg/m²   CURRENT TEMPERATURE:  Temp: 98.8 °F (37.1 °C)  CONSTITUTIONAL: No acute distress.  EYES: Vision is intact.  ENT: No sore throat.  No ear drainage.  NECK: No JVD.  BACK: Symmetric.  LUNGS:  diminished breath sounds right base and left base  CARDIOVASCULAR:  normal S1 and S2, no S3, and no S4  ABDOMEN:  non-distended  NEUROLOGIC: No focal deficits.    EXTREMITIES: No edema cyanosis or clubbing.    DATA:      Cardiology Labs:    Lab Results   Component Value Date/Time     06/03/2024 03:58 AM    K 3.9 06/03/2024 03:58 AM     06/03/2024 03:58 AM    CO2 25 06/03/2024 03:58 AM    BUN 8 06/03/2024 03:58 AM    CREATININE 0.8 06/03/2024 03:58 AM    GLUCOSE 113 06/03/2024 03:58 AM    CALCIUM 8.1 06/03/2024 03:58 AM    LABGLOM >90 06/03/2024 03:58 AM      CBC:    Recent Labs     06/03/24  1237 06/03/24  0358 06/02/24  0459 06/01/24  1215 06/01/24  0502   WBC  --  7.5 8.2  --  7.9   HGB 8.7* 7.6* 8.3*   < > 8.3*   HCT 26.2* 23.9* 25.3*   < > 25.0*   MCV  --  87.2 86.1  --  87.1   PLT  --  220 225  --  193    < > = values in this interval not displayed.     TROPONIN:    Lab Results   Component Value Date    TROPHS 22 (H) 05/31/2024       ASSESSMENT & PLAN:      1.chronic atrial fibrillation.  Eliquis was held due to nosebleed and significant drop in hemoglobin with anemia and suspicion for GI bleed.  EGD did not show acute bleeding source.  I was in the room when the nurse was cleaning the patient.  There was black discoloration of the stool seen.  Patient apparently had colonoscopy last month with removal of benign polyps.  Gastroenterologist feel this anemia and blood in stool are mostly related to his recent nosebleed.  Patient was re started on Eliquis and will monitor hemoglobin level closely    2.coronary artery

## 2024-06-05 VITALS
RESPIRATION RATE: 16 BRPM | SYSTOLIC BLOOD PRESSURE: 136 MMHG | DIASTOLIC BLOOD PRESSURE: 78 MMHG | TEMPERATURE: 98 F | WEIGHT: 235.7 LBS | HEIGHT: 74 IN | OXYGEN SATURATION: 95 % | HEART RATE: 70 BPM | BODY MASS INDEX: 30.25 KG/M2

## 2024-06-05 LAB
ALBUMIN SERPL-MCNC: 3.2 G/DL (ref 3.5–5.2)
ALP SERPL-CCNC: 83 U/L (ref 40–129)
ALT SERPL-CCNC: 14 U/L (ref 0–40)
ANION GAP SERPL CALCULATED.3IONS-SCNC: 9 MMOL/L (ref 7–16)
AST SERPL-CCNC: 22 U/L (ref 0–39)
BASOPHILS # BLD: 0.03 K/UL (ref 0–0.2)
BASOPHILS NFR BLD: 0 % (ref 0–2)
BILIRUB SERPL-MCNC: 0.5 MG/DL (ref 0–1.2)
BUN SERPL-MCNC: 12 MG/DL (ref 6–23)
CALCIUM SERPL-MCNC: 8.5 MG/DL (ref 8.6–10.2)
CHLORIDE SERPL-SCNC: 102 MMOL/L (ref 98–107)
CO2 SERPL-SCNC: 26 MMOL/L (ref 22–29)
CREAT SERPL-MCNC: 0.9 MG/DL (ref 0.7–1.2)
EOSINOPHIL # BLD: 0.11 K/UL (ref 0.05–0.5)
EOSINOPHILS RELATIVE PERCENT: 1 % (ref 0–6)
ERYTHROCYTE [DISTWIDTH] IN BLOOD BY AUTOMATED COUNT: 17.2 % (ref 11.5–15)
GFR, ESTIMATED: 89 ML/MIN/1.73M2
GLUCOSE SERPL-MCNC: 98 MG/DL (ref 74–99)
HCT VFR BLD AUTO: 26.8 % (ref 37–54)
HCT VFR BLD AUTO: 27.4 % (ref 37–54)
HGB BLD-MCNC: 8.3 G/DL (ref 12.5–16.5)
HGB BLD-MCNC: 8.8 G/DL (ref 12.5–16.5)
IMM GRANULOCYTES # BLD AUTO: 0.04 K/UL (ref 0–0.58)
IMM GRANULOCYTES NFR BLD: 1 % (ref 0–5)
LYMPHOCYTES NFR BLD: 1.68 K/UL (ref 1.5–4)
LYMPHOCYTES RELATIVE PERCENT: 21 % (ref 20–42)
MAGNESIUM SERPL-MCNC: 1.9 MG/DL (ref 1.6–2.6)
MCH RBC QN AUTO: 27.8 PG (ref 26–35)
MCHC RBC AUTO-ENTMCNC: 31 G/DL (ref 32–34.5)
MCV RBC AUTO: 89.6 FL (ref 80–99.9)
MONOCYTES NFR BLD: 0.72 K/UL (ref 0.1–0.95)
MONOCYTES NFR BLD: 9 % (ref 2–12)
NEUTROPHILS NFR BLD: 67 % (ref 43–80)
NEUTS SEG NFR BLD: 5.3 K/UL (ref 1.8–7.3)
PLATELET # BLD AUTO: 271 K/UL (ref 130–450)
PMV BLD AUTO: 10.2 FL (ref 7–12)
POTASSIUM SERPL-SCNC: 3.7 MMOL/L (ref 3.5–5)
PROT SERPL-MCNC: 6 G/DL (ref 6.4–8.3)
RBC # BLD AUTO: 2.99 M/UL (ref 3.8–5.8)
SODIUM SERPL-SCNC: 137 MMOL/L (ref 132–146)
WBC OTHER # BLD: 7.9 K/UL (ref 4.5–11.5)

## 2024-06-05 PROCEDURE — 2580000003 HC RX 258: Performed by: INTERNAL MEDICINE

## 2024-06-05 PROCEDURE — 85025 COMPLETE CBC W/AUTO DIFF WBC: CPT

## 2024-06-05 PROCEDURE — 80053 COMPREHEN METABOLIC PANEL: CPT

## 2024-06-05 PROCEDURE — 36415 COLL VENOUS BLD VENIPUNCTURE: CPT

## 2024-06-05 PROCEDURE — 85014 HEMATOCRIT: CPT

## 2024-06-05 PROCEDURE — 83735 ASSAY OF MAGNESIUM: CPT

## 2024-06-05 PROCEDURE — 6370000000 HC RX 637 (ALT 250 FOR IP): Performed by: INTERNAL MEDICINE

## 2024-06-05 PROCEDURE — 85018 HEMOGLOBIN: CPT

## 2024-06-05 RX ORDER — MIRTAZAPINE 15 MG/1
15 TABLET, FILM COATED ORAL NIGHTLY
Qty: 30 TABLET | Refills: 0 | Status: SHIPPED | OUTPATIENT
Start: 2024-06-05

## 2024-06-05 RX ORDER — THIAMINE MONONITRATE (VIT B1) 100 MG
100 TABLET ORAL DAILY
Qty: 30 TABLET | Refills: 0 | COMMUNITY
Start: 2024-06-06

## 2024-06-05 RX ORDER — FOLIC ACID 1 MG/1
1 TABLET ORAL DAILY
Qty: 30 TABLET | Refills: 0 | COMMUNITY
Start: 2024-06-06

## 2024-06-05 RX ORDER — OMEPRAZOLE 40 MG/1
40 CAPSULE, DELAYED RELEASE ORAL 2 TIMES DAILY
Qty: 60 CAPSULE | Refills: 0 | Status: SHIPPED | OUTPATIENT
Start: 2024-06-05

## 2024-06-05 RX ORDER — ERGOCALCIFEROL 1.25 MG/1
50000 CAPSULE ORAL WEEKLY
Qty: 5 CAPSULE | Refills: 0 | COMMUNITY
Start: 2024-06-07

## 2024-06-05 RX ADMIN — SERTRALINE 25 MG: 50 TABLET, FILM COATED ORAL at 09:47

## 2024-06-05 RX ADMIN — CYANOCOBALAMIN TAB 1000 MCG 1000 MCG: 1000 TAB at 09:47

## 2024-06-05 RX ADMIN — SODIUM CHLORIDE, PRESERVATIVE FREE 10 ML: 5 INJECTION INTRAVENOUS at 09:48

## 2024-06-05 RX ADMIN — SALINE NASAL SPRAY 2 SPRAY: 1.5 SOLUTION NASAL at 09:48

## 2024-06-05 RX ADMIN — SALINE NASAL SPRAY 2 SPRAY: 1.5 SOLUTION NASAL at 14:47

## 2024-06-05 RX ADMIN — FOLIC ACID 1 MG: 1 TABLET ORAL at 09:48

## 2024-06-05 RX ADMIN — PANTOPRAZOLE SODIUM 40 MG: 40 TABLET, DELAYED RELEASE ORAL at 06:12

## 2024-06-05 RX ADMIN — QUETIAPINE FUMARATE 25 MG: 25 TABLET ORAL at 09:48

## 2024-06-05 RX ADMIN — APIXABAN 2.5 MG: 2.5 TABLET, FILM COATED ORAL at 09:48

## 2024-06-05 RX ADMIN — Medication 100 MG: at 09:48

## 2024-06-05 ASSESSMENT — PAIN SCALES - GENERAL: PAINLEVEL_OUTOF10: 0

## 2024-06-05 NOTE — DISCHARGE SUMMARY
Internal Medicine Progress Note     [facility]     Mustapha Sims D.O., JEOTonyI.                         Doug Keller D.O., JEOTonyI.  Devyn Hemphill D.O.     Anabella Burgess, MSN, APRN, NP-C  Kevin Perez, MSN, APRN-CNP  Kj Carreon, MSN, APRN, NP-C  Rosenda Ruiz, MSN, APRN-CNP  Parul Hill, MSN, APRN, NP-C       Internal Medicine  Discharge Summary    NAME: Faisal Pathak  :  1950  MRN:  [id number]  PCP:Joshua Vitale MD  ADMITTED: 2024      DISCHARGED: 24    ADMITTING PHYSICIAN: Mustapha Sims DO    CONSULTANT(S):   IP CONSULT TO OTOLARYNGOLOGY  IP CONSULT TO CARDIOLOGY  IP CONSULT TO GI     ADMITTING DIAGNOSIS:   GI bleed [K92.2]     DISCHARGE DIAGNOSES:   Severe epistaxis resulting in blood loss anemia, stable despite resumption of anticoagulation  Hospital-acquired delirium on top of Alzheimer's dementia chronically on Aricept  Coronary artery disease, status post coronary artery bypass grafting.  Atrial fibrillation on Eliquis therapy chronically  History of gastroesophageal reflux disease.  Essential hypertension.  History of hyperlipidemia, on Pravachol.  Overweight with elevated body mass index of 29.94.  Current use of nicotine  Vitamin deficiencies including vitamin B12/vitamin D    BRIEF HISTORY OF PRESENT ILLNESS:   This is a pleasant 73-year-old white male, who was transferred from [facility]. The patient normally sees his primary care doctor, Dr. Blaine Vitale. The patient apparently presented to [facility] with what appeared to be epistaxis. The patient had been on anticoagulant therapy because of paroxysmal atrial fibrillation. The patient apparently developed epistaxis a day ago, which got progressively worse. This has been uncontrolled bleeding, at which time he presented to the emergency room at [facility]. It was noted at that time the patient had significant amount of bleeding. A

## 2024-06-05 NOTE — PLAN OF CARE
Problem: Discharge Planning  Goal: Discharge to home or other facility with appropriate resources  6/5/2024 1137 by Jennie Hansen RN  Outcome: Progressing  6/4/2024 2244 by Chencho Cox RN  Outcome: Progressing     Problem: Skin/Tissue Integrity  Goal: Absence of new skin breakdown  Description: 1.  Monitor for areas of redness and/or skin breakdown  2.  Assess vascular access sites hourly  3.  Every 4-6 hours minimum:  Change oxygen saturation probe site  4.  Every 4-6 hours:  If on nasal continuous positive airway pressure, respiratory therapy assess nares and determine need for appliance change or resting period.  6/5/2024 1137 by Jennie Hansen RN  Outcome: Progressing  6/4/2024 2244 by Chencho Cox RN  Outcome: Progressing     Problem: ABCDS Injury Assessment  Goal: Absence of physical injury  6/4/2024 2244 by Chencho Cox RN  Outcome: Progressing     Problem: Safety - Adult  Goal: Free from fall injury  6/5/2024 1137 by Jennie Hansen RN  Outcome: Progressing  6/4/2024 2244 by Chencho Cox RN  Outcome: Progressing     Problem: Skin/Tissue Integrity - Adult  Goal: Skin integrity remains intact  6/4/2024 2244 by Chencho Cox RN  Outcome: Progressing  Flowsheets (Taken 6/4/2024 2000)  Skin Integrity Remains Intact: Monitor for areas of redness and/or skin breakdown

## 2024-06-05 NOTE — CARE COORDINATION
24 1313 CM note: no covid testing. Room air. 1:1 sitter. Hx dementia, afib- on eliquis, SSS. Discharge order noted. . Discharge plan is home. Pt's wife  a couple of years ago and his sister-in-law Esperanza moved in with pt to care for him. He has 24/7 care from family and they decline HHC or any other needs. States pt will be back to normal mentation when he gets home. His DME includes a ww, cane, and an adustable bed. Family will provide transport home. Electronically signed by Maryanne Demarco RN on 2024 at 1:15 PM

## 2024-06-05 NOTE — PROGRESS NOTES
CLINICAL PHARMACY NOTE: MEDS TO BEDS    Total # of Prescriptions Filled: 2   The following medications were delivered to the patient:  Eliquis 2.5 mg  Mirtazapine 15 mg    Additional Documentation:    Patient's daughter says that they don't need the Omeprazole as they have this medication at home already.

## 2024-06-06 LAB
EKG ATRIAL RATE: 94 BPM
EKG Q-T INTERVAL: 464 MS
EKG QRS DURATION: 158 MS
EKG QTC CALCULATION (BAZETT): 501 MS
EKG R AXIS: -59 DEGREES
EKG T AXIS: 87 DEGREES
EKG VENTRICULAR RATE: 70 BPM

## 2024-09-24 ENCOUNTER — APPOINTMENT (OUTPATIENT)
Dept: ULTRASOUND IMAGING | Age: 74
End: 2024-09-24
Payer: MEDICARE

## 2024-09-24 ENCOUNTER — HOSPITAL ENCOUNTER (EMERGENCY)
Age: 74
Discharge: HOME | End: 2024-09-24
Attending: EMERGENCY MEDICINE
Payer: MEDICARE

## 2024-09-24 ENCOUNTER — APPOINTMENT (OUTPATIENT)
Dept: GENERAL RADIOLOGY | Age: 74
End: 2024-09-24
Payer: MEDICARE

## 2024-09-24 VITALS
RESPIRATION RATE: 20 BRPM | HEART RATE: 72 BPM | DIASTOLIC BLOOD PRESSURE: 69 MMHG | TEMPERATURE: 97.5 F | OXYGEN SATURATION: 100 % | SYSTOLIC BLOOD PRESSURE: 113 MMHG

## 2024-09-24 DIAGNOSIS — N30.00 ACUTE CYSTITIS WITHOUT HEMATURIA: Primary | ICD-10-CM

## 2024-09-24 LAB
ALBUMIN SERPL-MCNC: 3.9 G/DL (ref 3.5–5.2)
ALP SERPL-CCNC: 84 U/L (ref 40–129)
ALT SERPL-CCNC: 13 U/L (ref 0–40)
ANION GAP SERPL CALCULATED.3IONS-SCNC: 8 MMOL/L (ref 7–16)
AST SERPL-CCNC: 20 U/L (ref 0–39)
BACTERIA URNS QL MICRO: ABNORMAL
BASOPHILS # BLD: 0.04 K/UL (ref 0–0.2)
BASOPHILS NFR BLD: 1 % (ref 0–2)
BILIRUB SERPL-MCNC: 0.4 MG/DL (ref 0–1.2)
BILIRUB UR QL STRIP: NEGATIVE
BUN SERPL-MCNC: 14 MG/DL (ref 6–23)
CALCIUM SERPL-MCNC: 8.9 MG/DL (ref 8.6–10.2)
CHLORIDE SERPL-SCNC: 103 MMOL/L (ref 98–107)
CLARITY UR: ABNORMAL
CO2 SERPL-SCNC: 26 MMOL/L (ref 22–29)
COLOR UR: YELLOW
CREAT SERPL-MCNC: 0.9 MG/DL (ref 0.7–1.2)
EOSINOPHIL # BLD: 0.1 K/UL (ref 0.05–0.5)
EOSINOPHILS RELATIVE PERCENT: 2 % (ref 0–6)
ERYTHROCYTE [DISTWIDTH] IN BLOOD BY AUTOMATED COUNT: 19 % (ref 11.5–15)
GFR, ESTIMATED: >90 ML/MIN/1.73M2
GLUCOSE SERPL-MCNC: 111 MG/DL (ref 74–99)
GLUCOSE UR STRIP-MCNC: NEGATIVE MG/DL
HCT VFR BLD AUTO: 29.8 % (ref 37–54)
HGB BLD-MCNC: 8.3 G/DL (ref 12.5–16.5)
HGB UR QL STRIP.AUTO: ABNORMAL
KETONES UR STRIP-MCNC: ABNORMAL MG/DL
LACTATE BLDV-SCNC: 1.7 MMOL/L (ref 0.5–1.9)
LEUKOCYTE ESTERASE UR QL STRIP: ABNORMAL
LIPASE SERPL-CCNC: 24 U/L (ref 13–60)
LYMPHOCYTES NFR BLD: 1.54 K/UL (ref 1.5–4)
LYMPHOCYTES RELATIVE PERCENT: 31 % (ref 20–42)
MCH RBC QN AUTO: 19.3 PG (ref 26–35)
MCHC RBC AUTO-ENTMCNC: 27.9 G/DL (ref 32–34.5)
MCV RBC AUTO: 69.1 FL (ref 80–99.9)
MONOCYTES NFR BLD: 0.52 K/UL (ref 0.1–0.95)
MONOCYTES NFR BLD: 10 % (ref 2–12)
NEUTROPHILS NFR BLD: 56 % (ref 43–80)
NEUTS SEG NFR BLD: 2.76 K/UL (ref 1.8–7.3)
NITRITE UR QL STRIP: POSITIVE
PH UR STRIP: 6 [PH] (ref 5–9)
PLATELET # BLD AUTO: 237 K/UL (ref 130–450)
PMV BLD AUTO: 9.6 FL (ref 7–12)
POTASSIUM SERPL-SCNC: 4.6 MMOL/L (ref 3.5–5)
PROT SERPL-MCNC: 7 G/DL (ref 6.4–8.3)
PROT UR STRIP-MCNC: ABNORMAL MG/DL
RBC # BLD AUTO: 4.31 M/UL (ref 3.8–5.8)
RBC # BLD: ABNORMAL 10*6/UL
RBC #/AREA URNS HPF: ABNORMAL /HPF
SODIUM SERPL-SCNC: 137 MMOL/L (ref 132–146)
SP GR UR STRIP: 1.02 (ref 1–1.03)
TROPONIN I SERPL HS-MCNC: 19 NG/L (ref 0–11)
TROPONIN I SERPL HS-MCNC: 19 NG/L (ref 0–11)
UROBILINOGEN UR STRIP-ACNC: 2 EU/DL (ref 0–1)
WBC #/AREA URNS HPF: ABNORMAL /HPF
WBC OTHER # BLD: 5 K/UL (ref 4.5–11.5)

## 2024-09-24 PROCEDURE — 83690 ASSAY OF LIPASE: CPT

## 2024-09-24 PROCEDURE — 87077 CULTURE AEROBIC IDENTIFY: CPT

## 2024-09-24 PROCEDURE — 71046 X-RAY EXAM CHEST 2 VIEWS: CPT

## 2024-09-24 PROCEDURE — 93971 EXTREMITY STUDY: CPT

## 2024-09-24 PROCEDURE — 6360000002 HC RX W HCPCS: Performed by: EMERGENCY MEDICINE

## 2024-09-24 PROCEDURE — 85025 COMPLETE CBC W/AUTO DIFF WBC: CPT

## 2024-09-24 PROCEDURE — 93005 ELECTROCARDIOGRAM TRACING: CPT | Performed by: EMERGENCY MEDICINE

## 2024-09-24 PROCEDURE — 96374 THER/PROPH/DIAG INJ IV PUSH: CPT

## 2024-09-24 PROCEDURE — 81001 URINALYSIS AUTO W/SCOPE: CPT

## 2024-09-24 PROCEDURE — 2580000003 HC RX 258: Performed by: EMERGENCY MEDICINE

## 2024-09-24 PROCEDURE — 87086 URINE CULTURE/COLONY COUNT: CPT

## 2024-09-24 PROCEDURE — 6370000000 HC RX 637 (ALT 250 FOR IP): Performed by: EMERGENCY MEDICINE

## 2024-09-24 PROCEDURE — 84484 ASSAY OF TROPONIN QUANT: CPT

## 2024-09-24 PROCEDURE — 2500000003 HC RX 250 WO HCPCS: Performed by: EMERGENCY MEDICINE

## 2024-09-24 PROCEDURE — 80053 COMPREHEN METABOLIC PANEL: CPT

## 2024-09-24 PROCEDURE — 96375 TX/PRO/DX INJ NEW DRUG ADDON: CPT

## 2024-09-24 PROCEDURE — 83605 ASSAY OF LACTIC ACID: CPT

## 2024-09-24 PROCEDURE — 99285 EMERGENCY DEPT VISIT HI MDM: CPT

## 2024-09-24 RX ORDER — CEFDINIR 300 MG/1
300 CAPSULE ORAL 2 TIMES DAILY
Qty: 10 CAPSULE | Refills: 0 | Status: SHIPPED | OUTPATIENT
Start: 2024-09-24 | End: 2024-09-29

## 2024-09-24 RX ADMIN — WATER 1000 MG: 1 INJECTION INTRAMUSCULAR; INTRAVENOUS; SUBCUTANEOUS at 19:48

## 2024-09-24 RX ADMIN — ALUMINUM HYDROXIDE, MAGNESIUM HYDROXIDE, AND SIMETHICONE: 1200; 120; 1200 SUSPENSION ORAL at 13:51

## 2024-09-24 RX ADMIN — FAMOTIDINE 20 MG: 10 INJECTION, SOLUTION INTRAVENOUS at 13:51

## 2024-09-24 ASSESSMENT — ENCOUNTER SYMPTOMS
CHEST TIGHTNESS: 1
SHORTNESS OF BREATH: 0

## 2024-09-27 LAB
EKG ATRIAL RATE: 227 BPM
EKG Q-T INTERVAL: 464 MS
EKG QRS DURATION: 166 MS
EKG QTC CALCULATION (BAZETT): 504 MS
EKG R AXIS: -69 DEGREES
EKG T AXIS: 80 DEGREES
EKG VENTRICULAR RATE: 71 BPM
MICROORGANISM SPEC CULT: ABNORMAL
SERVICE CMNT-IMP: ABNORMAL
SPECIMEN DESCRIPTION: ABNORMAL

## 2024-09-27 PROCEDURE — 93010 ELECTROCARDIOGRAM REPORT: CPT | Performed by: INTERNAL MEDICINE

## 2025-01-01 ENCOUNTER — OUTSIDE SERVICES (OUTPATIENT)
Dept: PRIMARY CARE CLINIC | Age: 75
End: 2025-01-01

## 2025-01-01 DIAGNOSIS — N39.0 SEPSIS DUE TO URINARY TRACT INFECTION (HCC): ICD-10-CM

## 2025-01-01 DIAGNOSIS — E43 SEVERE PROTEIN-CALORIE MALNUTRITION: Chronic | ICD-10-CM

## 2025-01-01 DIAGNOSIS — F02.C0 DEMENTIA IN OTHER DISEASES CLASSIFIED ELSEWHERE, SEVERE, WITHOUT BEHAVIORAL DISTURBANCE, PSYCHOTIC DISTURBANCE, MOOD DISTURBANCE, AND ANXIETY (HCC): Primary | ICD-10-CM

## 2025-01-01 DIAGNOSIS — Z95.0 CARDIAC PACEMAKER IN SITU: ICD-10-CM

## 2025-01-01 DIAGNOSIS — I49.5 SSS (SICK SINUS SYNDROME) (HCC): ICD-10-CM

## 2025-01-01 DIAGNOSIS — E78.2 MIXED HYPERLIPIDEMIA: ICD-10-CM

## 2025-01-01 DIAGNOSIS — I48.0 PAROXYSMAL ATRIAL FIBRILLATION (HCC): ICD-10-CM

## 2025-01-01 DIAGNOSIS — I10 PRIMARY HYPERTENSION: ICD-10-CM

## 2025-01-01 DIAGNOSIS — A41.9 SEPSIS DUE TO URINARY TRACT INFECTION (HCC): ICD-10-CM

## 2025-02-26 ENCOUNTER — HOSPITAL ENCOUNTER (INPATIENT)
Age: 75
LOS: 6 days | Discharge: HOSPICE | DRG: 871 | End: 2025-03-04
Attending: EMERGENCY MEDICINE | Admitting: INTERNAL MEDICINE
Payer: MEDICARE

## 2025-02-26 ENCOUNTER — APPOINTMENT (OUTPATIENT)
Dept: GENERAL RADIOLOGY | Age: 75
DRG: 871 | End: 2025-02-26
Payer: MEDICARE

## 2025-02-26 DIAGNOSIS — N39.0 URINARY TRACT INFECTION WITHOUT HEMATURIA, SITE UNSPECIFIED: ICD-10-CM

## 2025-02-26 DIAGNOSIS — R65.20 SEPSIS WITH ACUTE RENAL FAILURE WITHOUT SEPTIC SHOCK, DUE TO UNSPECIFIED ORGANISM, UNSPECIFIED ACUTE RENAL FAILURE TYPE (HCC): Primary | ICD-10-CM

## 2025-02-26 DIAGNOSIS — R78.81 BACTEREMIA: ICD-10-CM

## 2025-02-26 DIAGNOSIS — A41.9 SEPSIS WITH ACUTE RENAL FAILURE WITHOUT SEPTIC SHOCK, DUE TO UNSPECIFIED ORGANISM, UNSPECIFIED ACUTE RENAL FAILURE TYPE (HCC): Primary | ICD-10-CM

## 2025-02-26 DIAGNOSIS — N17.9 SEPSIS WITH ACUTE RENAL FAILURE WITHOUT SEPTIC SHOCK, DUE TO UNSPECIFIED ORGANISM, UNSPECIFIED ACUTE RENAL FAILURE TYPE (HCC): Primary | ICD-10-CM

## 2025-02-26 DIAGNOSIS — R41.82 ALTERED MENTAL STATUS, UNSPECIFIED ALTERED MENTAL STATUS TYPE: ICD-10-CM

## 2025-02-26 DIAGNOSIS — E87.0 HYPERNATREMIA: ICD-10-CM

## 2025-02-26 LAB
ALBUMIN SERPL-MCNC: 3.2 G/DL (ref 3.5–5.2)
ALLEN TEST: POSITIVE
ALP SERPL-CCNC: 80 U/L (ref 40–129)
ALT SERPL-CCNC: 23 U/L (ref 0–40)
ANION GAP SERPL CALCULATED.3IONS-SCNC: 14 MMOL/L (ref 7–16)
AST SERPL-CCNC: 73 U/L (ref 0–39)
B PARAP IS1001 DNA NPH QL NAA+NON-PROBE: NOT DETECTED
B PERT DNA SPEC QL NAA+PROBE: NOT DETECTED
BACTERIA URNS QL MICRO: ABNORMAL
BASOPHILS # BLD: 0.02 K/UL (ref 0–0.2)
BASOPHILS NFR BLD: 0 % (ref 0–2)
BILIRUB SERPL-MCNC: 0.7 MG/DL (ref 0–1.2)
BILIRUB UR QL STRIP: ABNORMAL
BNP SERPL-MCNC: 4983 PG/ML (ref 0–450)
BUN SERPL-MCNC: 69 MG/DL (ref 6–23)
C PNEUM DNA NPH QL NAA+NON-PROBE: NOT DETECTED
CALCIUM SERPL-MCNC: 9.5 MG/DL (ref 8.6–10.2)
CHLORIDE SERPL-SCNC: 114 MMOL/L (ref 98–107)
CHP ED QC CHECK: YES
CLARITY UR: ABNORMAL
CO2 SERPL-SCNC: 29 MMOL/L (ref 22–29)
COLOR UR: YELLOW
CREAT SERPL-MCNC: 2.9 MG/DL (ref 0.7–1.2)
EOSINOPHIL # BLD: 0 K/UL (ref 0.05–0.5)
EOSINOPHILS RELATIVE PERCENT: 0 % (ref 0–6)
EPI CELLS #/AREA URNS HPF: ABNORMAL /HPF
ERYTHROCYTE [DISTWIDTH] IN BLOOD BY AUTOMATED COUNT: 17.5 % (ref 11.5–15)
FLUAV RNA NPH QL NAA+NON-PROBE: NOT DETECTED
FLUAV RNA RESP QL NAA+PROBE: NOT DETECTED
FLUBV RNA NPH QL NAA+NON-PROBE: NOT DETECTED
FLUBV RNA RESP QL NAA+PROBE: NOT DETECTED
GFR, ESTIMATED: 21 ML/MIN/1.73M2
GLUCOSE BLD-MCNC: 150 MG/DL
GLUCOSE SERPL-MCNC: 150 MG/DL (ref 74–99)
GLUCOSE UR STRIP-MCNC: NEGATIVE MG/DL
HADV DNA NPH QL NAA+NON-PROBE: NOT DETECTED
HCOV 229E RNA NPH QL NAA+NON-PROBE: NOT DETECTED
HCOV HKU1 RNA NPH QL NAA+NON-PROBE: NOT DETECTED
HCOV NL63 RNA NPH QL NAA+NON-PROBE: NOT DETECTED
HCOV OC43 RNA NPH QL NAA+NON-PROBE: NOT DETECTED
HCT VFR BLD AUTO: 44.1 % (ref 37–54)
HGB BLD-MCNC: 14.1 G/DL (ref 12.5–16.5)
HGB UR QL STRIP.AUTO: ABNORMAL
HMPV RNA NPH QL NAA+NON-PROBE: NOT DETECTED
HPIV1 RNA NPH QL NAA+NON-PROBE: NOT DETECTED
HPIV2 RNA NPH QL NAA+NON-PROBE: NOT DETECTED
HPIV3 RNA NPH QL NAA+NON-PROBE: NOT DETECTED
HPIV4 RNA NPH QL NAA+NON-PROBE: NOT DETECTED
IMM GRANULOCYTES # BLD AUTO: 0.04 K/UL (ref 0–0.58)
IMM GRANULOCYTES NFR BLD: 0 % (ref 0–5)
KETONES UR STRIP-MCNC: NEGATIVE MG/DL
LACTATE BLDV-SCNC: 2 MMOL/L (ref 0.5–1.9)
LACTATE BLDV-SCNC: 2.1 MMOL/L (ref 0.5–1.9)
LEUKOCYTE ESTERASE UR QL STRIP: ABNORMAL
LYMPHOCYTES NFR BLD: 1.23 K/UL (ref 1.5–4)
LYMPHOCYTES RELATIVE PERCENT: 12 % (ref 20–42)
M PNEUMO DNA NPH QL NAA+NON-PROBE: NOT DETECTED
MCH RBC QN AUTO: 28.6 PG (ref 26–35)
MCHC RBC AUTO-ENTMCNC: 32 G/DL (ref 32–34.5)
MCV RBC AUTO: 89.5 FL (ref 80–99.9)
MONOCYTES NFR BLD: 0.97 K/UL (ref 0.1–0.95)
MONOCYTES NFR BLD: 10 % (ref 2–12)
NEUTROPHILS NFR BLD: 77 % (ref 43–80)
NEUTS SEG NFR BLD: 7.63 K/UL (ref 1.8–7.3)
NITRITE UR QL STRIP: POSITIVE
O2 DELIVERY DEVICE: ABNORMAL
PH UR STRIP: 6 [PH] (ref 5–8)
PLATELET # BLD AUTO: 164 K/UL (ref 130–450)
PMV BLD AUTO: 12.1 FL (ref 7–12)
POC HCO3: 32.8 MMOL/L (ref 22–26)
POC O2 SATURATION: 96.9 % (ref 92–98.5)
POC PCO2: 40 MM HG (ref 35–45)
POC PH: 7.52 (ref 7.35–7.45)
POC PO2: 80.4 MM HG (ref 60–80)
POSITIVE BASE EXCESS, ART: 9.1 MMOL/L (ref 0–3)
POTASSIUM SERPL-SCNC: 3.8 MMOL/L (ref 3.5–5)
PROCALCITONIN SERPL-MCNC: 0.97 NG/ML (ref 0–0.08)
PROT SERPL-MCNC: 7.1 G/DL (ref 6.4–8.3)
PROT UR STRIP-MCNC: 30 MG/DL
RBC # BLD AUTO: 4.93 M/UL (ref 3.8–5.8)
RBC #/AREA URNS HPF: ABNORMAL /HPF
RSV RNA NPH QL NAA+NON-PROBE: DETECTED
RV+EV RNA NPH QL NAA+NON-PROBE: NOT DETECTED
SAMPLE SITE: ABNORMAL
SARS-COV-2 RNA NPH QL NAA+NON-PROBE: NOT DETECTED
SARS-COV-2 RNA RESP QL NAA+PROBE: NOT DETECTED
SODIUM SERPL-SCNC: 157 MMOL/L (ref 132–146)
SOURCE: NORMAL
SP GR UR STRIP: 1.01 (ref 1–1.03)
SPECIMEN DESCRIPTION: ABNORMAL
SPECIMEN DESCRIPTION: NORMAL
TROPONIN I SERPL HS-MCNC: 68 NG/L (ref 0–11)
TROPONIN I SERPL HS-MCNC: 80 NG/L (ref 0–11)
UROBILINOGEN UR STRIP-ACNC: 0.2 EU/DL (ref 0–1)
WBC #/AREA URNS HPF: ABNORMAL /HPF
WBC OTHER # BLD: 9.9 K/UL (ref 4.5–11.5)

## 2025-02-26 PROCEDURE — 87636 SARSCOV2 & INF A&B AMP PRB: CPT

## 2025-02-26 PROCEDURE — 71045 X-RAY EXAM CHEST 1 VIEW: CPT

## 2025-02-26 PROCEDURE — 51702 INSERT TEMP BLADDER CATH: CPT

## 2025-02-26 PROCEDURE — 6360000002 HC RX W HCPCS

## 2025-02-26 PROCEDURE — 96374 THER/PROPH/DIAG INJ IV PUSH: CPT

## 2025-02-26 PROCEDURE — 84484 ASSAY OF TROPONIN QUANT: CPT

## 2025-02-26 PROCEDURE — 80053 COMPREHEN METABOLIC PANEL: CPT

## 2025-02-26 PROCEDURE — 2580000003 HC RX 258: Performed by: EMERGENCY MEDICINE

## 2025-02-26 PROCEDURE — 84145 PROCALCITONIN (PCT): CPT

## 2025-02-26 PROCEDURE — 6360000002 HC RX W HCPCS: Performed by: EMERGENCY MEDICINE

## 2025-02-26 PROCEDURE — 2060000000 HC ICU INTERMEDIATE R&B

## 2025-02-26 PROCEDURE — 2580000003 HC RX 258

## 2025-02-26 PROCEDURE — 0202U NFCT DS 22 TRGT SARS-COV-2: CPT

## 2025-02-26 PROCEDURE — 87040 BLOOD CULTURE FOR BACTERIA: CPT

## 2025-02-26 PROCEDURE — 6370000000 HC RX 637 (ALT 250 FOR IP): Performed by: EMERGENCY MEDICINE

## 2025-02-26 PROCEDURE — 83605 ASSAY OF LACTIC ACID: CPT

## 2025-02-26 PROCEDURE — 82803 BLOOD GASES ANY COMBINATION: CPT

## 2025-02-26 PROCEDURE — 87150 DNA/RNA AMPLIFIED PROBE: CPT

## 2025-02-26 PROCEDURE — 81001 URINALYSIS AUTO W/SCOPE: CPT

## 2025-02-26 PROCEDURE — 99285 EMERGENCY DEPT VISIT HI MDM: CPT

## 2025-02-26 PROCEDURE — 85025 COMPLETE CBC W/AUTO DIFF WBC: CPT

## 2025-02-26 PROCEDURE — 83880 ASSAY OF NATRIURETIC PEPTIDE: CPT

## 2025-02-26 PROCEDURE — 2500000003 HC RX 250 WO HCPCS: Performed by: EMERGENCY MEDICINE

## 2025-02-26 RX ORDER — PROCHLORPERAZINE EDISYLATE 5 MG/ML
10 INJECTION INTRAMUSCULAR; INTRAVENOUS EVERY 6 HOURS PRN
Status: DISCONTINUED | OUTPATIENT
Start: 2025-02-26 | End: 2025-03-04 | Stop reason: HOSPADM

## 2025-02-26 RX ORDER — 0.9 % SODIUM CHLORIDE 0.9 %
1000 INTRAVENOUS SOLUTION INTRAVENOUS ONCE
Status: COMPLETED | OUTPATIENT
Start: 2025-02-26 | End: 2025-02-26

## 2025-02-26 RX ORDER — NALTREXONE HCL 1.5 MG
3 CAPSULE ORAL DAILY
COMMUNITY

## 2025-02-26 RX ORDER — MAGNESIUM HYDROXIDE/ALUMINUM HYDROXICE/SIMETHICONE 120; 1200; 1200 MG/30ML; MG/30ML; MG/30ML
15 SUSPENSION ORAL EVERY 8 HOURS PRN
Status: DISCONTINUED | OUTPATIENT
Start: 2025-02-26 | End: 2025-03-04 | Stop reason: HOSPADM

## 2025-02-26 RX ORDER — ACETAMINOPHEN 325 MG/1
650 TABLET ORAL EVERY 4 HOURS PRN
COMMUNITY

## 2025-02-26 RX ORDER — ACETAMINOPHEN 650 MG/1
650 SUPPOSITORY RECTAL ONCE
Status: COMPLETED | OUTPATIENT
Start: 2025-02-26 | End: 2025-02-26

## 2025-02-26 RX ORDER — PANTOPRAZOLE SODIUM 40 MG/1
40 TABLET, DELAYED RELEASE ORAL
Status: DISCONTINUED | OUTPATIENT
Start: 2025-02-27 | End: 2025-03-04 | Stop reason: HOSPADM

## 2025-02-26 RX ORDER — HEPARIN SODIUM 5000 [USP'U]/ML
5000 INJECTION, SOLUTION INTRAVENOUS; SUBCUTANEOUS EVERY 8 HOURS SCHEDULED
Status: DISCONTINUED | OUTPATIENT
Start: 2025-02-26 | End: 2025-02-26

## 2025-02-26 RX ORDER — MECOBALAMIN 5000 MCG
5 TABLET,DISINTEGRATING ORAL NIGHTLY PRN
Status: DISCONTINUED | OUTPATIENT
Start: 2025-02-26 | End: 2025-03-04 | Stop reason: HOSPADM

## 2025-02-26 RX ORDER — ACETAMINOPHEN 650 MG/1
650 SUPPOSITORY RECTAL EVERY 4 HOURS PRN
Status: DISCONTINUED | OUTPATIENT
Start: 2025-02-26 | End: 2025-03-04 | Stop reason: HOSPADM

## 2025-02-26 RX ORDER — OMEPRAZOLE 40 MG/1
40 CAPSULE, DELAYED RELEASE ORAL DAILY
Status: ON HOLD | COMMUNITY
End: 2025-03-03 | Stop reason: HOSPADM

## 2025-02-26 RX ORDER — FUROSEMIDE 40 MG/1
80 TABLET ORAL DAILY
Status: DISCONTINUED | OUTPATIENT
Start: 2025-02-27 | End: 2025-03-04 | Stop reason: HOSPADM

## 2025-02-26 RX ORDER — ACETAMINOPHEN 325 MG/1
650 TABLET ORAL EVERY 6 HOURS PRN
Status: DISCONTINUED | OUTPATIENT
Start: 2025-02-26 | End: 2025-03-04 | Stop reason: HOSPADM

## 2025-02-26 RX ORDER — MEMANTINE HYDROCHLORIDE 5 MG/1
5 TABLET ORAL EVERY 12 HOURS
Status: DISCONTINUED | OUTPATIENT
Start: 2025-02-26 | End: 2025-03-04 | Stop reason: HOSPADM

## 2025-02-26 RX ORDER — PRAVASTATIN SODIUM 20 MG
40 TABLET ORAL NIGHTLY
Status: DISCONTINUED | OUTPATIENT
Start: 2025-02-27 | End: 2025-03-04 | Stop reason: HOSPADM

## 2025-02-26 RX ORDER — DIVALPROEX SODIUM 250 MG/1
250 TABLET, DELAYED RELEASE ORAL EVERY 12 HOURS
Status: DISCONTINUED | OUTPATIENT
Start: 2025-02-26 | End: 2025-03-04 | Stop reason: HOSPADM

## 2025-02-26 RX ORDER — ONDANSETRON 4 MG/1
4 TABLET, FILM COATED ORAL EVERY 6 HOURS PRN
Status: ON HOLD | COMMUNITY
End: 2025-03-03 | Stop reason: HOSPADM

## 2025-02-26 RX ORDER — FUROSEMIDE 40 MG/1
80 TABLET ORAL DAILY
Status: ON HOLD | COMMUNITY
End: 2025-03-03 | Stop reason: HOSPADM

## 2025-02-26 RX ORDER — ALUMINA, MAGNESIA, AND SIMETHICONE 2400; 2400; 240 MG/30ML; MG/30ML; MG/30ML
15 SUSPENSION ORAL EVERY 8 HOURS PRN
Status: ON HOLD | COMMUNITY
End: 2025-03-03 | Stop reason: HOSPADM

## 2025-02-26 RX ORDER — MEMANTINE HYDROCHLORIDE 5 MG/1
5 TABLET ORAL EVERY 12 HOURS
COMMUNITY

## 2025-02-26 RX ADMIN — WATER 2000 MG: 1 INJECTION INTRAMUSCULAR; INTRAVENOUS; SUBCUTANEOUS at 18:03

## 2025-02-26 RX ADMIN — VANCOMYCIN HYDROCHLORIDE 1500 MG: 500 INJECTION, POWDER, LYOPHILIZED, FOR SOLUTION INTRAVENOUS at 22:06

## 2025-02-26 RX ADMIN — SODIUM CHLORIDE 1000 ML: 0.9 INJECTION, SOLUTION INTRAVENOUS at 15:36

## 2025-02-26 RX ADMIN — ACETAMINOPHEN 650 MG: 650 SUPPOSITORY RECTAL at 15:51

## 2025-02-26 NOTE — ED PROVIDER NOTES
ED course.    Diagnosis:  1. Sepsis with acute renal failure without septic shock, due to unspecified organism, unspecified acute renal failure type (HCC)    2. Altered mental status, unspecified altered mental status type    3. Hypernatremia    4. Urinary tract infection without hematuria, site unspecified        Disposition:  Patient's disposition: Admit to telemetry  Patient's condition is stable.    Please note that the withdrawal or failure to initiate urgent interventions for this patient would likely result in a life threatening deterioration or permanent disability.      Accordingly this patient received 35 minutes of critical care time, excluding separately billable procedures.       Taj Shah,   02/26/25 1936       Taj Shah,   02/26/25 2012

## 2025-02-27 LAB
25(OH)D3 SERPL-MCNC: 9.7 NG/ML (ref 30–100)
ALBUMIN SERPL-MCNC: 3 G/DL (ref 3.5–5.2)
ALP SERPL-CCNC: 82 U/L (ref 40–129)
ALT SERPL-CCNC: 26 U/L (ref 0–40)
AMMONIA PLAS-SCNC: 25 UMOL/L (ref 16–60)
ANION GAP SERPL CALCULATED.3IONS-SCNC: 11 MMOL/L (ref 7–16)
AST SERPL-CCNC: 71 U/L (ref 0–39)
BASOPHILS # BLD: 0 K/UL (ref 0–0.2)
BASOPHILS NFR BLD: 0 % (ref 0–2)
BILIRUB SERPL-MCNC: 0.6 MG/DL (ref 0–1.2)
BUN SERPL-MCNC: 66 MG/DL (ref 6–23)
CALCIUM SERPL-MCNC: 9.3 MG/DL (ref 8.6–10.2)
CHLORIDE SERPL-SCNC: 117 MMOL/L (ref 98–107)
CHOLEST SERPL-MCNC: 174 MG/DL
CO2 SERPL-SCNC: 30 MMOL/L (ref 22–29)
CREAT SERPL-MCNC: 2 MG/DL (ref 0.7–1.2)
DATE LAST DOSE: ABNORMAL
DATE LAST DOSE: NORMAL
EOSINOPHIL # BLD: 0 K/UL (ref 0.05–0.5)
EOSINOPHILS RELATIVE PERCENT: 0 % (ref 0–6)
ERYTHROCYTE [DISTWIDTH] IN BLOOD BY AUTOMATED COUNT: 17.3 % (ref 11.5–15)
FOLATE SERPL-MCNC: 11.6 NG/ML (ref 4.8–24.2)
GFR, ESTIMATED: 34 ML/MIN/1.73M2
GLUCOSE SERPL-MCNC: 155 MG/DL (ref 74–99)
HCT VFR BLD AUTO: 45.4 % (ref 37–54)
HDLC SERPL-MCNC: 30 MG/DL
HGB BLD-MCNC: 13.7 G/DL (ref 12.5–16.5)
IRON SATN MFR SERPL: 15 % (ref 20–55)
IRON SERPL-MCNC: 29 UG/DL (ref 59–158)
LDLC SERPL CALC-MCNC: 108 MG/DL
LYMPHOCYTES NFR BLD: 0.84 K/UL (ref 1.5–4)
LYMPHOCYTES RELATIVE PERCENT: 9 % (ref 20–42)
MAGNESIUM SERPL-MCNC: 2.4 MG/DL (ref 1.6–2.6)
MCH RBC QN AUTO: 28.6 PG (ref 26–35)
MCHC RBC AUTO-ENTMCNC: 30.2 G/DL (ref 32–34.5)
MCV RBC AUTO: 94.8 FL (ref 80–99.9)
METAMYELOCYTES ABSOLUTE COUNT: 0.08 K/UL (ref 0–0.12)
METAMYELOCYTES: 1 % (ref 0–1)
MONOCYTES NFR BLD: 0.08 K/UL (ref 0.1–0.95)
MONOCYTES NFR BLD: 1 % (ref 2–12)
NEUTROPHILS NFR BLD: 90 % (ref 43–80)
NEUTS SEG NFR BLD: 8.69 K/UL (ref 1.8–7.3)
PHOSPHATE SERPL-MCNC: 3.7 MG/DL (ref 2.5–4.5)
PLATELET CONFIRMATION: NORMAL
PLATELET, FLUORESCENCE: 104 K/UL (ref 130–450)
PMV BLD AUTO: 12.4 FL (ref 7–12)
POTASSIUM SERPL-SCNC: 3.7 MMOL/L (ref 3.5–5)
PROT SERPL-MCNC: 6.9 G/DL (ref 6.4–8.3)
RBC # BLD AUTO: 4.79 M/UL (ref 3.8–5.8)
RBC # BLD: ABNORMAL 10*6/UL
SODIUM SERPL-SCNC: 158 MMOL/L (ref 132–146)
T4 FREE SERPL-MCNC: 0.8 NG/DL (ref 0.9–1.7)
TIBC SERPL-MCNC: 192 UG/DL (ref 250–450)
TME LAST DOSE: ABNORMAL H
TME LAST DOSE: NORMAL H
TRIGL SERPL-MCNC: 182 MG/DL
TROPONIN I SERPL HS-MCNC: 46 NG/L (ref 0–11)
TSH SERPL DL<=0.05 MIU/L-ACNC: 0.84 UIU/ML (ref 0.27–4.2)
VALPROATE SERPL-MCNC: 9 UG/ML (ref 50–100)
VANCOMYCIN DOSE: ABNORMAL MG
VANCOMYCIN DOSE: NORMAL MG
VANCOMYCIN SERPL-MCNC: 8.4 UG/ML (ref 5–40)
VIT B12 SERPL-MCNC: 380 PG/ML (ref 211–946)
VLDLC SERPL CALC-MCNC: 36 MG/DL
WBC OTHER # BLD: 9.7 K/UL (ref 4.5–11.5)

## 2025-02-27 PROCEDURE — 83735 ASSAY OF MAGNESIUM: CPT

## 2025-02-27 PROCEDURE — 2060000000 HC ICU INTERMEDIATE R&B

## 2025-02-27 PROCEDURE — 2580000003 HC RX 258

## 2025-02-27 PROCEDURE — 84484 ASSAY OF TROPONIN QUANT: CPT

## 2025-02-27 PROCEDURE — 84439 ASSAY OF FREE THYROXINE: CPT

## 2025-02-27 PROCEDURE — 80061 LIPID PANEL: CPT

## 2025-02-27 PROCEDURE — 6360000002 HC RX W HCPCS

## 2025-02-27 PROCEDURE — 80164 ASSAY DIPROPYLACETIC ACD TOT: CPT

## 2025-02-27 PROCEDURE — 82607 VITAMIN B-12: CPT

## 2025-02-27 PROCEDURE — 83550 IRON BINDING TEST: CPT

## 2025-02-27 PROCEDURE — 80202 ASSAY OF VANCOMYCIN: CPT

## 2025-02-27 PROCEDURE — 80053 COMPREHEN METABOLIC PANEL: CPT

## 2025-02-27 PROCEDURE — 99223 1ST HOSP IP/OBS HIGH 75: CPT | Performed by: NURSE PRACTITIONER

## 2025-02-27 PROCEDURE — 2500000003 HC RX 250 WO HCPCS

## 2025-02-27 PROCEDURE — 84443 ASSAY THYROID STIM HORMONE: CPT

## 2025-02-27 PROCEDURE — 84100 ASSAY OF PHOSPHORUS: CPT

## 2025-02-27 PROCEDURE — 36415 COLL VENOUS BLD VENIPUNCTURE: CPT

## 2025-02-27 PROCEDURE — 82140 ASSAY OF AMMONIA: CPT

## 2025-02-27 PROCEDURE — 82746 ASSAY OF FOLIC ACID SERUM: CPT

## 2025-02-27 PROCEDURE — 83540 ASSAY OF IRON: CPT

## 2025-02-27 PROCEDURE — 6370000000 HC RX 637 (ALT 250 FOR IP)

## 2025-02-27 PROCEDURE — 82306 VITAMIN D 25 HYDROXY: CPT

## 2025-02-27 PROCEDURE — 85025 COMPLETE CBC W/AUTO DIFF WBC: CPT

## 2025-02-27 RX ORDER — HEPARIN SODIUM 5000 [USP'U]/ML
5000 INJECTION, SOLUTION INTRAVENOUS; SUBCUTANEOUS EVERY 8 HOURS SCHEDULED
Status: DISCONTINUED | OUTPATIENT
Start: 2025-02-27 | End: 2025-03-04 | Stop reason: HOSPADM

## 2025-02-27 RX ORDER — SODIUM CHLORIDE 9 MG/ML
INJECTION, SOLUTION INTRAVENOUS CONTINUOUS
Status: DISCONTINUED | OUTPATIENT
Start: 2025-02-27 | End: 2025-02-28

## 2025-02-27 RX ADMIN — HEPARIN SODIUM 5000 UNITS: 5000 INJECTION INTRAVENOUS; SUBCUTANEOUS at 21:12

## 2025-02-27 RX ADMIN — VANCOMYCIN HYDROCHLORIDE 1000 MG: 1 INJECTION, POWDER, LYOPHILIZED, FOR SOLUTION INTRAVENOUS at 17:57

## 2025-02-27 RX ADMIN — SODIUM CHLORIDE: 0.9 INJECTION, SOLUTION INTRAVENOUS at 07:57

## 2025-02-27 RX ADMIN — ACETAMINOPHEN 650 MG: 650 SUPPOSITORY RECTAL at 14:18

## 2025-02-27 RX ADMIN — WATER 1000 MG: 1 INJECTION INTRAMUSCULAR; INTRAVENOUS; SUBCUTANEOUS at 17:49

## 2025-02-27 ASSESSMENT — LIFESTYLE VARIABLES
HOW MANY STANDARD DRINKS CONTAINING ALCOHOL DO YOU HAVE ON A TYPICAL DAY: PATIENT DOES NOT DRINK
HOW OFTEN DO YOU HAVE A DRINK CONTAINING ALCOHOL: NEVER

## 2025-02-27 ASSESSMENT — PAIN SCALES - WONG BAKER: WONGBAKER_NUMERICALRESPONSE: NO HURT

## 2025-02-27 ASSESSMENT — PAIN - FUNCTIONAL ASSESSMENT: PAIN_FUNCTIONAL_ASSESSMENT: ADULT NONVERBAL PAIN SCALE (NPVS)

## 2025-02-27 NOTE — CARE COORDINATION
Case Management Assessment  Initial Evaluation    Date/Time of Evaluation: 2/27/2025 5:36 PM  Assessment Completed by: JUANITO Headley    If patient is discharged prior to next notation, then this note serves as note for discharge by case management.    Patient Name: Faisal Pathak                   YOB: 1950  Diagnosis: Hypernatremia [E87.0]  Sepsis due to urinary tract infection (HCC) [A41.9, N39.0]  Urinary tract infection without hematuria, site unspecified [N39.0]  Altered mental status, unspecified altered mental status type [R41.82]  Sepsis with acute renal failure without septic shock, due to unspecified organism, unspecified acute renal failure type (HCC) [A41.9, R65.20, N17.9]                   Date / Time: 2/26/2025  2:10 PM    Patient Admission Status: Inpatient   Readmission Risk (Low < 19, Mod (19-27), High > 27): Readmission Risk Score: 14.1    Current PCP: Joshua Vitale MD  PCP verified by CM? Yes    Chart Reviewed: Yes      History Provided by: Child/Family, Medical Record  Patient Orientation: Unresponsive    Patient Cognition: Dementia / Early Alzheimer's (non verbal)    Hospitalization in the last 30 days (Readmission):  No    If yes, Readmission Assessment in CM Navigator will be completed.    Advance Directives:      Code Status: Limited   Patient's Primary Decision Maker is: Legal Next of Kin    Primary Decision Maker: Daphne Norton - Child - [phone]    Primary Decision Maker: Adama Pathak - Child - [phone]    Discharge Planning:    Patient lives with: Family Members Type of Home: Skilled Nursing Facility  Primary Care Giver: Other (Comment) (snf)  Patient Support Systems include: Children, Family Members   Current Financial resources:    Current community resources:    Current services prior to admission: None            Current DME:              Type of Home Care services:  None    ADLS  Prior functional level: Assistance with the following:, Bathing,

## 2025-02-27 NOTE — ACP (ADVANCE CARE PLANNING)
Advance Care Planning     Advance Care Planning Activator (Inpatient)  Conversation Note      Date of ACP Conversation: 2/27/2025     Conversation Conducted with: Healthcare Decision Maker    ACP Activator: JUANITO Headley      Health Care Decision Maker:     Current Designated Health Care Decision Maker:     Primary Decision Maker: Daphne Norton - Child - [phone]    Primary Decision Maker: Adama Pathak - Child - [phone]  Click here to complete Healthcare Decision Makers including section of the Healthcare Decision Maker Relationship (ie \"Primary\")  Today we documented Decision Maker(s) consistent with Legal Next of Kin hierarchy.    Care Preferences    Ventilation:  \"If you were in your present state of health and suddenly became very ill and were unable to breathe on your own, what would your preference be about the use of a ventilator (breathing machine) if it were available to you?\"      Would the patient desire the use of ventilator (breathing machine)?: no    \"If your health worsens and it becomes clear that your chance of recovery is unlikely, what would your preference be about the use of a ventilator (breathing machine) if it were available to you?\"     Would the patient desire the use of ventilator (breathing machine)?: No      Resuscitation  \"CPR works best to restart the heart when there is a sudden event, like a heart attack, in someone who is otherwise healthy. Unfortunately, CPR does not typically restart the heart for people who have serious health conditions or who are very sick.\"    \"In the event your heart stopped as a result of an underlying serious health condition, would you want attempts to be made to restart your heart (answer \"yes\" for attempt to resuscitate) or would you prefer a natural death (answer \"no\" for do not attempt to resuscitate)?\" no       [] Yes   [x] No   Educated Patient / Decision Maker regarding differences between Advance Directives and portable DNR 
no constipation/no motor function loss/no anorexia/no bladder dysfunction/no bowel dysfunction

## 2025-02-27 NOTE — H&P
as needed pain medicine, IV ABX have been ordered. Consults will be made to PT/OT/SW, nephrology, and palliative care. SCDs and eliquis for VTE prophylaxis, pantoprazole for GI prophylaxis. IVF per nephrology.       The patient was seen, examined and then discussed with Dr. Sims.      JADE Gonzalez CNP  7:38 PM  2/26/2025    Electronically signed by JADE Gonzalez CNP on 2/26/2025 at 8:59 PM    I have personally participated in the history, exam, medical decision making with the nurse practitioner on the date of service and I agree with all the pertinent clinical information unless otherwise noted.  I have also reviewed and agree with the past medical, family, and social history unless otherwise noted.      Mustapha Sims DO,., FACOI  5:43 AM  2/27/2025

## 2025-02-28 LAB
ALBUMIN SERPL-MCNC: 2.2 G/DL (ref 3.5–5.2)
ALP SERPL-CCNC: 78 U/L (ref 40–129)
ALT SERPL-CCNC: 35 U/L (ref 0–40)
ANION GAP SERPL CALCULATED.3IONS-SCNC: 10 MMOL/L (ref 7–16)
ANION GAP SERPL CALCULATED.3IONS-SCNC: 6 MMOL/L (ref 7–16)
ANION GAP SERPL CALCULATED.3IONS-SCNC: 7 MMOL/L (ref 7–16)
ANION GAP SERPL CALCULATED.3IONS-SCNC: 9 MMOL/L (ref 7–16)
AST SERPL-CCNC: 82 U/L (ref 0–39)
BASOPHILS # BLD: 0.01 K/UL (ref 0–0.2)
BASOPHILS NFR BLD: 0 % (ref 0–2)
BILIRUB SERPL-MCNC: 0.6 MG/DL (ref 0–1.2)
BUN SERPL-MCNC: 47 MG/DL (ref 6–23)
BUN SERPL-MCNC: 49 MG/DL (ref 6–23)
BUN SERPL-MCNC: 50 MG/DL (ref 6–23)
BUN SERPL-MCNC: 58 MG/DL (ref 6–23)
CALCIUM SERPL-MCNC: 8.9 MG/DL (ref 8.6–10.2)
CALCIUM SERPL-MCNC: 9 MG/DL (ref 8.6–10.2)
CHLORIDE SERPL-SCNC: 126 MMOL/L (ref 98–107)
CHLORIDE SERPL-SCNC: 127 MMOL/L (ref 98–107)
CO2 SERPL-SCNC: 29 MMOL/L (ref 22–29)
CO2 SERPL-SCNC: 32 MMOL/L (ref 22–29)
CO2 SERPL-SCNC: 33 MMOL/L (ref 22–29)
CO2 SERPL-SCNC: 33 MMOL/L (ref 22–29)
CREAT SERPL-MCNC: 1.3 MG/DL (ref 0.7–1.2)
CREAT SERPL-MCNC: 1.4 MG/DL (ref 0.7–1.2)
DATE LAST DOSE: NORMAL
EOSINOPHIL # BLD: 0 K/UL (ref 0.05–0.5)
EOSINOPHILS RELATIVE PERCENT: 0 % (ref 0–6)
ERYTHROCYTE [DISTWIDTH] IN BLOOD BY AUTOMATED COUNT: 17.4 % (ref 11.5–15)
GFR, ESTIMATED: 50 ML/MIN/1.73M2
GFR, ESTIMATED: 55 ML/MIN/1.73M2
GFR, ESTIMATED: 55 ML/MIN/1.73M2
GFR, ESTIMATED: 58 ML/MIN/1.73M2
GLUCOSE SERPL-MCNC: 122 MG/DL (ref 74–99)
GLUCOSE SERPL-MCNC: 154 MG/DL (ref 74–99)
GLUCOSE SERPL-MCNC: 165 MG/DL (ref 74–99)
GLUCOSE SERPL-MCNC: 171 MG/DL (ref 74–99)
HCT VFR BLD AUTO: 39.8 % (ref 37–54)
HGB BLD-MCNC: 12.2 G/DL (ref 12.5–16.5)
IMM GRANULOCYTES # BLD AUTO: 0.05 K/UL (ref 0–0.58)
IMM GRANULOCYTES NFR BLD: 1 % (ref 0–5)
LYMPHOCYTES NFR BLD: 1.05 K/UL (ref 1.5–4)
LYMPHOCYTES RELATIVE PERCENT: 12 % (ref 20–42)
MAGNESIUM SERPL-MCNC: 2.6 MG/DL (ref 1.6–2.6)
MCH RBC QN AUTO: 28.8 PG (ref 26–35)
MCHC RBC AUTO-ENTMCNC: 30.7 G/DL (ref 32–34.5)
MCV RBC AUTO: 94.1 FL (ref 80–99.9)
MONOCYTES NFR BLD: 0.65 K/UL (ref 0.1–0.95)
MONOCYTES NFR BLD: 7 % (ref 2–12)
NEUTROPHILS NFR BLD: 81 % (ref 43–80)
NEUTS SEG NFR BLD: 7.41 K/UL (ref 1.8–7.3)
PHOSPHATE SERPL-MCNC: 2.8 MG/DL (ref 2.5–4.5)
PLATELET # BLD AUTO: 140 K/UL (ref 130–450)
PMV BLD AUTO: 12.6 FL (ref 7–12)
POTASSIUM SERPL-SCNC: 3.3 MMOL/L (ref 3.5–5)
POTASSIUM SERPL-SCNC: 3.4 MMOL/L (ref 3.5–5)
POTASSIUM SERPL-SCNC: 3.4 MMOL/L (ref 3.5–5)
POTASSIUM SERPL-SCNC: 3.8 MMOL/L (ref 3.5–5)
PROT SERPL-MCNC: 5.9 G/DL (ref 6.4–8.3)
RBC # BLD AUTO: 4.23 M/UL (ref 3.8–5.8)
SODIUM SERPL-SCNC: 166 MMOL/L (ref 132–146)
SODIUM SERPL-SCNC: 166 MMOL/L (ref 132–146)
SODIUM SERPL-SCNC: 167 MMOL/L (ref 132–146)
SODIUM SERPL-SCNC: 167 MMOL/L (ref 132–146)
TME LAST DOSE: NORMAL H
VANCOMYCIN DOSE: NORMAL MG
VANCOMYCIN SERPL-MCNC: 7.3 UG/ML (ref 5–40)
WBC OTHER # BLD: 9.2 K/UL (ref 4.5–11.5)

## 2025-02-28 PROCEDURE — 2580000003 HC RX 258

## 2025-02-28 PROCEDURE — 97161 PT EVAL LOW COMPLEX 20 MIN: CPT | Performed by: PHYSICAL THERAPIST

## 2025-02-28 PROCEDURE — 83735 ASSAY OF MAGNESIUM: CPT

## 2025-02-28 PROCEDURE — 85025 COMPLETE CBC W/AUTO DIFF WBC: CPT

## 2025-02-28 PROCEDURE — 6370000000 HC RX 637 (ALT 250 FOR IP)

## 2025-02-28 PROCEDURE — 97165 OT EVAL LOW COMPLEX 30 MIN: CPT

## 2025-02-28 PROCEDURE — 99231 SBSQ HOSP IP/OBS SF/LOW 25: CPT | Performed by: NURSE PRACTITIONER

## 2025-02-28 PROCEDURE — 84100 ASSAY OF PHOSPHORUS: CPT

## 2025-02-28 PROCEDURE — 36415 COLL VENOUS BLD VENIPUNCTURE: CPT

## 2025-02-28 PROCEDURE — 6360000002 HC RX W HCPCS

## 2025-02-28 PROCEDURE — 2580000003 HC RX 258: Performed by: INTERNAL MEDICINE

## 2025-02-28 PROCEDURE — 92526 ORAL FUNCTION THERAPY: CPT

## 2025-02-28 PROCEDURE — 80048 BASIC METABOLIC PNL TOTAL CA: CPT

## 2025-02-28 PROCEDURE — 92610 EVALUATE SWALLOWING FUNCTION: CPT

## 2025-02-28 PROCEDURE — 2500000003 HC RX 250 WO HCPCS

## 2025-02-28 PROCEDURE — 2060000000 HC ICU INTERMEDIATE R&B

## 2025-02-28 PROCEDURE — 2700000000 HC OXYGEN THERAPY PER DAY

## 2025-02-28 PROCEDURE — 80053 COMPREHEN METABOLIC PANEL: CPT

## 2025-02-28 PROCEDURE — 80202 ASSAY OF VANCOMYCIN: CPT

## 2025-02-28 PROCEDURE — 97110 THERAPEUTIC EXERCISES: CPT | Performed by: PHYSICAL THERAPIST

## 2025-02-28 RX ORDER — DEXTROSE MONOHYDRATE 50 MG/ML
INJECTION, SOLUTION INTRAVENOUS CONTINUOUS
Status: DISCONTINUED | OUTPATIENT
Start: 2025-02-28 | End: 2025-02-28

## 2025-02-28 RX ADMIN — HEPARIN SODIUM 5000 UNITS: 5000 INJECTION INTRAVENOUS; SUBCUTANEOUS at 06:14

## 2025-02-28 RX ADMIN — POTASSIUM CHLORIDE: 2 INJECTION, SOLUTION, CONCENTRATE INTRAVENOUS at 13:17

## 2025-02-28 RX ADMIN — VANCOMYCIN HYDROCHLORIDE 1250 MG: 500 INJECTION, POWDER, LYOPHILIZED, FOR SOLUTION INTRAVENOUS at 21:59

## 2025-02-28 RX ADMIN — DEXTROSE MONOHYDRATE: 50 INJECTION, SOLUTION INTRAVENOUS at 12:00

## 2025-02-28 RX ADMIN — HEPARIN SODIUM 5000 UNITS: 5000 INJECTION INTRAVENOUS; SUBCUTANEOUS at 21:59

## 2025-02-28 RX ADMIN — WATER 1000 MG: 1 INJECTION INTRAMUSCULAR; INTRAVENOUS; SUBCUTANEOUS at 17:20

## 2025-02-28 RX ADMIN — HEPARIN SODIUM 5000 UNITS: 5000 INJECTION INTRAVENOUS; SUBCUTANEOUS at 13:19

## 2025-02-28 RX ADMIN — DEXTROSE MONOHYDRATE: 50 INJECTION, SOLUTION INTRAVENOUS at 01:49

## 2025-02-28 RX ADMIN — POTASSIUM CHLORIDE: 2 INJECTION, SOLUTION, CONCENTRATE INTRAVENOUS at 21:56

## 2025-02-28 RX ADMIN — ACETAMINOPHEN 650 MG: 650 SUPPOSITORY RECTAL at 05:21

## 2025-02-28 ASSESSMENT — PAIN SCALES - WONG BAKER: WONGBAKER_NUMERICALRESPONSE: NO HURT

## 2025-02-28 NOTE — CARE COORDINATION
2/28/2025 239p (sf) SS NOTE: CARLOS ALBERTO met with pt and dtr Kelsey at bedside. Per Kelsey, plan is for pt to return to [facility] on [place] where he is LTC. However, Kelsey states they are considering Hospice but would like to see how pt does over the weekend before making a final decision. Dtr is aware if pt should return to [facility] with Hospice that family would have to private pay for room and board as it is not covered under the insurance. Dtr stated understanding and states they are currently private paying.     CARLOS ALBERTO lvm for liaison @ [facility] to provide update-waiting on a return call.     Electronically signed by JUANITO Zuleta on 2/28/2025 at 2:44 PM

## 2025-03-01 PROBLEM — E43 SEVERE PROTEIN-CALORIE MALNUTRITION: Chronic | Status: ACTIVE | Noted: 2025-01-01

## 2025-03-01 LAB
ALBUMIN SERPL-MCNC: 2.4 G/DL (ref 3.5–5.2)
ALP SERPL-CCNC: 90 U/L (ref 40–129)
ALT SERPL-CCNC: 40 U/L (ref 0–40)
ANION GAP SERPL CALCULATED.3IONS-SCNC: 5 MMOL/L (ref 7–16)
ANION GAP SERPL CALCULATED.3IONS-SCNC: 6 MMOL/L (ref 7–16)
ANION GAP SERPL CALCULATED.3IONS-SCNC: 6 MMOL/L (ref 7–16)
ANION GAP SERPL CALCULATED.3IONS-SCNC: 8 MMOL/L (ref 7–16)
AST SERPL-CCNC: 58 U/L (ref 0–39)
BASOPHILS # BLD: 0.01 K/UL (ref 0–0.2)
BASOPHILS NFR BLD: 0 % (ref 0–2)
BILIRUB SERPL-MCNC: 0.5 MG/DL (ref 0–1.2)
BUN SERPL-MCNC: 34 MG/DL (ref 6–23)
BUN SERPL-MCNC: 37 MG/DL (ref 6–23)
BUN SERPL-MCNC: 39 MG/DL (ref 6–23)
BUN SERPL-MCNC: 42 MG/DL (ref 6–23)
CALCIUM SERPL-MCNC: 8.7 MG/DL (ref 8.6–10.2)
CALCIUM SERPL-MCNC: 8.7 MG/DL (ref 8.6–10.2)
CALCIUM SERPL-MCNC: 9 MG/DL (ref 8.6–10.2)
CALCIUM SERPL-MCNC: 9.2 MG/DL (ref 8.6–10.2)
CHLORIDE SERPL-SCNC: 124 MMOL/L (ref 98–107)
CHLORIDE SERPL-SCNC: 126 MMOL/L (ref 98–107)
CHLORIDE SERPL-SCNC: 127 MMOL/L (ref 98–107)
CHLORIDE SERPL-SCNC: 127 MMOL/L (ref 98–107)
CO2 SERPL-SCNC: 31 MMOL/L (ref 22–29)
CO2 SERPL-SCNC: 32 MMOL/L (ref 22–29)
CO2 SERPL-SCNC: 32 MMOL/L (ref 22–29)
CO2 SERPL-SCNC: 33 MMOL/L (ref 22–29)
CREAT SERPL-MCNC: 0.9 MG/DL (ref 0.7–1.2)
CREAT SERPL-MCNC: 1 MG/DL (ref 0.7–1.2)
CREAT SERPL-MCNC: 1.1 MG/DL (ref 0.7–1.2)
CREAT SERPL-MCNC: 1.2 MG/DL (ref 0.7–1.2)
EOSINOPHIL # BLD: 0.12 K/UL (ref 0.05–0.5)
EOSINOPHILS RELATIVE PERCENT: 2 % (ref 0–6)
ERYTHROCYTE [DISTWIDTH] IN BLOOD BY AUTOMATED COUNT: 17.1 % (ref 11.5–15)
GFR, ESTIMATED: 66 ML/MIN/1.73M2
GFR, ESTIMATED: 70 ML/MIN/1.73M2
GFR, ESTIMATED: 78 ML/MIN/1.73M2
GFR, ESTIMATED: 84 ML/MIN/1.73M2
GLUCOSE BLD-MCNC: 150 MG/DL (ref 74–99)
GLUCOSE SERPL-MCNC: 116 MG/DL (ref 74–99)
GLUCOSE SERPL-MCNC: 137 MG/DL (ref 74–99)
GLUCOSE SERPL-MCNC: 141 MG/DL (ref 74–99)
GLUCOSE SERPL-MCNC: 147 MG/DL (ref 74–99)
HCT VFR BLD AUTO: 39.1 % (ref 37–54)
HGB BLD-MCNC: 11.6 G/DL (ref 12.5–16.5)
IMM GRANULOCYTES # BLD AUTO: 0.04 K/UL (ref 0–0.58)
IMM GRANULOCYTES NFR BLD: 1 % (ref 0–5)
LYMPHOCYTES NFR BLD: 1.16 K/UL (ref 1.5–4)
LYMPHOCYTES RELATIVE PERCENT: 14 % (ref 20–42)
MAGNESIUM SERPL-MCNC: 2.3 MG/DL (ref 1.6–2.6)
MCH RBC QN AUTO: 28.4 PG (ref 26–35)
MCHC RBC AUTO-ENTMCNC: 29.7 G/DL (ref 32–34.5)
MCV RBC AUTO: 95.6 FL (ref 80–99.9)
MONOCYTES NFR BLD: 0.4 K/UL (ref 0.1–0.95)
MONOCYTES NFR BLD: 5 % (ref 2–12)
NEUTROPHILS NFR BLD: 79 % (ref 43–80)
NEUTS SEG NFR BLD: 6.38 K/UL (ref 1.8–7.3)
PHOSPHATE SERPL-MCNC: 1.8 MG/DL (ref 2.5–4.5)
PLATELET # BLD AUTO: 129 K/UL (ref 130–450)
PMV BLD AUTO: 12.1 FL (ref 7–12)
POTASSIUM SERPL-SCNC: 3.4 MMOL/L (ref 3.5–5)
POTASSIUM SERPL-SCNC: 3.4 MMOL/L (ref 3.5–5)
POTASSIUM SERPL-SCNC: 3.5 MMOL/L (ref 3.5–5)
POTASSIUM SERPL-SCNC: 3.6 MMOL/L (ref 3.5–5)
PROT SERPL-MCNC: 5.9 G/DL (ref 6.4–8.3)
RBC # BLD AUTO: 4.09 M/UL (ref 3.8–5.8)
SODIUM SERPL-SCNC: 161 MMOL/L (ref 132–146)
SODIUM SERPL-SCNC: 164 MMOL/L (ref 132–146)
SODIUM SERPL-SCNC: 166 MMOL/L (ref 132–146)
SODIUM SERPL-SCNC: 166 MMOL/L (ref 132–146)
WBC OTHER # BLD: 8.1 K/UL (ref 4.5–11.5)

## 2025-03-01 PROCEDURE — 2580000003 HC RX 258: Performed by: INTERNAL MEDICINE

## 2025-03-01 PROCEDURE — 92526 ORAL FUNCTION THERAPY: CPT

## 2025-03-01 PROCEDURE — 82962 GLUCOSE BLOOD TEST: CPT

## 2025-03-01 PROCEDURE — 36415 COLL VENOUS BLD VENIPUNCTURE: CPT

## 2025-03-01 PROCEDURE — 2060000000 HC ICU INTERMEDIATE R&B

## 2025-03-01 PROCEDURE — 84100 ASSAY OF PHOSPHORUS: CPT

## 2025-03-01 PROCEDURE — 2580000003 HC RX 258

## 2025-03-01 PROCEDURE — 83735 ASSAY OF MAGNESIUM: CPT

## 2025-03-01 PROCEDURE — 80053 COMPREHEN METABOLIC PANEL: CPT

## 2025-03-01 PROCEDURE — 85025 COMPLETE CBC W/AUTO DIFF WBC: CPT

## 2025-03-01 PROCEDURE — 6360000002 HC RX W HCPCS

## 2025-03-01 PROCEDURE — 2500000003 HC RX 250 WO HCPCS

## 2025-03-01 PROCEDURE — 2700000000 HC OXYGEN THERAPY PER DAY

## 2025-03-01 PROCEDURE — 80048 BASIC METABOLIC PNL TOTAL CA: CPT

## 2025-03-01 RX ORDER — DEXTROSE MONOHYDRATE 50 MG/ML
INJECTION, SOLUTION INTRAVENOUS CONTINUOUS
Status: DISCONTINUED | OUTPATIENT
Start: 2025-03-01 | End: 2025-03-04 | Stop reason: HOSPADM

## 2025-03-01 RX ADMIN — VANCOMYCIN HYDROCHLORIDE 1250 MG: 1 INJECTION, POWDER, LYOPHILIZED, FOR SOLUTION INTRAVENOUS at 18:27

## 2025-03-01 RX ADMIN — HEPARIN SODIUM 5000 UNITS: 5000 INJECTION INTRAVENOUS; SUBCUTANEOUS at 16:04

## 2025-03-01 RX ADMIN — DEXTROSE MONOHYDRATE: 50 INJECTION, SOLUTION INTRAVENOUS at 18:33

## 2025-03-01 RX ADMIN — HEPARIN SODIUM 5000 UNITS: 5000 INJECTION INTRAVENOUS; SUBCUTANEOUS at 06:56

## 2025-03-01 RX ADMIN — WATER 1000 MG: 1 INJECTION INTRAMUSCULAR; INTRAVENOUS; SUBCUTANEOUS at 18:24

## 2025-03-01 RX ADMIN — HEPARIN SODIUM 5000 UNITS: 5000 INJECTION INTRAVENOUS; SUBCUTANEOUS at 20:04

## 2025-03-01 RX ADMIN — POTASSIUM CHLORIDE: 2 INJECTION, SOLUTION, CONCENTRATE INTRAVENOUS at 09:04

## 2025-03-01 ASSESSMENT — PAIN SCALES - WONG BAKER
WONGBAKER_NUMERICALRESPONSE: NO HURT
WONGBAKER_NUMERICALRESPONSE: NO HURT

## 2025-03-01 NOTE — FLOWSHEET NOTE
Inpatient Wound Care    Admit Date: 2/26/2025  2:10 PM    Reason for consult:  \"buttocks\"    Significant history:  Per H&P:  Patient presents to the emergency room due to lethargy, fevers, sacral wound, has baseline dementia [facility]  His daughter Bhavna is at cart side; she states her cousin works at nursing facility that he is at and has reported that he has been lethargic over the last 2 days and has had loss of appetite.  He is wheelchair-bound at the nursing home due to generalized weakness in his legs/OT services.  All questions were answered at this time to patient's daughter.  We did discuss a palliative care consult because he has had worsening dementia and frequent UTIs for the last 2 years; his wife passed away 3 years ago and she states \"it has been downhill since then\".  She states he has gotten aggressive during hospital stays and does well when people explain slowly what they are doing before they do it.  Patient does not use nicotine, alcohol, marijuana, or illicit drugs.     Findings:     03/01/25 1639   Skin Integrity Site 2   Skin Integrity Location 2 Redness  (blanchable)   Location 2 left heel   Preventative Dressing Yes   Assessed this shift Yes   Wound 02/27/25 Heel Right blood blister   Date First Assessed/Time First Assessed: 02/27/25 1707   Present on Original Admission: Yes  Primary Wound Type: Other (comment)  Location: Heel  Wound Location Orientation: Right  Wound Description (Comments): blood blister   Wound Image    Wound Etiology Deep tissue/Injury   Dressing Status New dressing applied   Wound Cleansed Cleansed with saline   Dressing/Treatment Xeroform;ABD;Roll gauze   Wound Length (cm) 6 cm   Wound Width (cm) 8.5 cm   Wound Depth (cm) 0 cm   Wound Surface Area (cm^2) 51 cm^2   Change in Wound Size % (l*w) -104   Wound Volume (cm^3) 0 cm^3   Wound Assessment Blood filled blister   Drainage Amount None (dry)   Odor None   Sanjana-wound Assessment Intact

## 2025-03-01 NOTE — CONSULTS
CONSULTATION NOTE :ID     Patient - Faisal Pathak,  Age - 74 y.o.    - 1950      Room Number - 0521/0521-01   N -  [id number]   [facility] # - [id number]  Date of Admission -  2025  2:10 PM  Patient's PCP: Joshua Vtiale MD     Requesting Physician: Mustapha Sims DO    HISTORY OF PRESENT ILLNESS   This is a 74 y.o. male who was admitted on 2025   to the hospital with a chief complaints of   Chief Complaint   Patient presents with    Fever     EMS reporting Fever of 104.5. Given rectal tylenol at 1330. Patient sent from facility, roommate diagnosed with flu. Baseline is alert to person.     ID was consulted on 25 for antibiotic management   NOT Knonw to ID   PT IS A POOR HISTORIAN  FROM SNF  HAD FEVERS 104.3 HYPOXIA ON RA   PT HAS MS CHANGES  [id number]  CURRENTLY AFEBRILE 2L  CR2.9 ->166 WBC 9.9->8.1 [id number]  UA PROTEIN NITRITE LE WBC LE HGB  BLOOD CX E FAECALIS  RSV +    Current antibiotics     vancomycin (VANCOCIN) 1,250 mg in sodium chloride 0.9 % 250 mL IVPB, Q18H  cefTRIAXone (ROCEPHIN) 1,000 mg in sterile water 10 mL IV syringe, Q24H         PAST MEDICAL AND SURGICAL HISTORY     Past Medical History:   Diagnosis Date    Atrial fibrillation (HCC)     Bleeding tendency     Dementia (HCC)     Epistaxis 2024    Hand pain     Heart attack (HCC) 2004    History of blood transfusion     Hyperlipidemia     Numbness     Sick sinus syndrome (HCC)        Past Surgical History:   Procedure Laterality Date    CERVICAL FUSION      Dr. Joseph / [facility]     CERVICAL FUSION  2019    multilevel posterior @ [facility]    CORONARY ARTERY BYPASS GRAFT  2004    ENDOSCOPY, COLON, DIAGNOSTIC  2024    by Dr. Massey    PACEMAKER PLACEMENT      UPPER GASTROINTESTINAL ENDOSCOPY N/A 2024    ESOPHAGOGASTRODUODENOSCOPY performed by Tucker Sewell MD at [facility] ENDOSCOPY       MEDICATIONS PRIOR TO ADMISSION:     Scheduled Meds:   vancomycin  1,250 
perform ROS: Dementia       OBJECTIVE:   Prognosis: Guarded    Physical Exam:  /80   Pulse 73   Temp 100.2 °F (37.9 °C) (Bladder)   Resp 20   Ht 1.88 m (6' 2\")   Wt 106.6 kg (235 lb)   SpO2 94%   BMI 30.17 kg/m²   Physical Exam  Vitals reviewed.   Constitutional:       Appearance: He is underweight. He is ill-appearing.      Comments: Ill-appearing male lying in bed   HENT:      Head: Normocephalic.      Mouth/Throat:      Lips: Pink.      Mouth: Mucous membranes are dry.   Cardiovascular:      Rate and Rhythm: Normal rate and regular rhythm.   Pulmonary:      Effort: Pulmonary effort is normal. No accessory muscle usage or respiratory distress.   Skin:     General: Skin is warm and dry.   Neurological:      Mental Status: Mental status is at baseline. He is lethargic.   Psychiatric:         Mood and Affect: Mood is not anxious.         Speech: He is noncommunicative.         Behavior: Behavior is not agitated.         Cognition and Memory: Cognition is impaired.       Objective data reviewed: labs, images, records, medication use, vitals, and chart    Discussed patient and the plan of care with the other IDT members: Palliative Medicine IDT Team, Patient, and Family    Time/Communication  Greater than 50% of time spent, total 75 minutes in counseling and coordination of care at the bedside regarding goals of care, diagnosis and prognosis, and see above.    Thank you for allowing Palliative Medicine to participate in the care of Faisal Pathak.    Note: This report was completed using computer[employer] voiced recognition software.  Every effort has been made to ensure accuracy; however, inadvertent computerized transcription errors may be present.    
IV syringe  1,000 mg IntraVENous Q24H Parul Hill APRN - CNP        pantoprazole (PROTONIX) tablet 40 mg  40 mg Oral QAM AC Parul Hill APRN - CNP        acetaminophen (TYLENOL) tablet 650 mg  650 mg Oral Q6H PRN Parul Hill APRN - CNP        prochlorperazine (COMPAZINE) injection 10 mg  10 mg IntraVENous Q6H PRN Parul Hill APRN - CNP        melatonin disintegrating tablet 5 mg  5 mg Oral Nightly PRN Parul Hill APRN - CNP        vancomycin (VANCOCIN) intermittent dosing (placeholder)   Other RX Placeholder Parul Hill APRN - CNP        aluminum & magnesium hydroxide-simethicone (MAALOX PLUS) 200-200-20 MG/5ML suspension 15 mL  15 mL Oral Q8H PRN Parul Hill APRN - CNP        apixaban (ELIQUIS) tablet 2.5 mg  2.5 mg Oral BID Parul Hill APRN - CNP        divalproex (DEPAKOTE) DR tablet 250 mg  250 mg Oral Q12H Parul Hill APRN - CNP        [Held by provider] furosemide (LASIX) tablet 80 mg  80 mg Oral Daily Parul Hill APRN - CNP        magnesium hydroxide (MILK OF MAGNESIA) 400 MG/5ML suspension 30 mL  30 mL Oral Daily PRN Parul Hill APRN - CNP        memantine (NAMENDA) tablet 5 mg  5 mg Oral Q12H Parul Hill APRN - CNP        pravastatin (PRAVACHOL) tablet 40 mg  40 mg Oral Nightly Parul Hill APRN - CNP        sertraline (ZOLOFT) tablet 25 mg  25 mg Oral Daily Parul Hill APRN - CNP        acetaminophen (TYLENOL) suppository 650 mg  650 mg Rectal Q4H PRN Parul Hill APRN - CNP         Current Outpatient Medications   Medication Sig Dispense Refill    Naltrexone HCl, Pain, (NALTREX) 1.5 MG CAPS Take 3 mg by mouth daily      omeprazole (PRILOSEC) 40 MG delayed release capsule Take 1 capsule by mouth daily      OXYGEN Inhale 2 L into the lungs daily as needed for Shortness of Breath      acetaminophen (TYLENOL) 325 MG tablet Take 2 tablets by mouth every 4 hours as needed for Pain or Fever      furosemide (LASIX) 40 MG tablet Take 2

## 2025-03-02 ENCOUNTER — APPOINTMENT (OUTPATIENT)
Dept: CT IMAGING | Age: 75
DRG: 871 | End: 2025-03-02
Payer: MEDICARE

## 2025-03-02 ENCOUNTER — APPOINTMENT (OUTPATIENT)
Dept: ULTRASOUND IMAGING | Age: 75
DRG: 871 | End: 2025-03-02
Payer: MEDICARE

## 2025-03-02 LAB
ACB COMPLEX DNA BLD POS QL NAA+NON-PROBE: NOT DETECTED
ANION GAP SERPL CALCULATED.3IONS-SCNC: 4 MMOL/L (ref 7–16)
ANION GAP SERPL CALCULATED.3IONS-SCNC: 4 MMOL/L (ref 7–16)
ANION GAP SERPL CALCULATED.3IONS-SCNC: 6 MMOL/L (ref 7–16)
ANION GAP SERPL CALCULATED.3IONS-SCNC: 8 MMOL/L (ref 7–16)
ANION GAP SERPL CALCULATED.3IONS-SCNC: 8 MMOL/L (ref 7–16)
B FRAGILIS DNA BLD POS QL NAA+NON-PROBE: NOT DETECTED
BIOFIRE TEST COMMENT: ABNORMAL
BUN SERPL-MCNC: 22 MG/DL (ref 6–23)
BUN SERPL-MCNC: 24 MG/DL (ref 6–23)
BUN SERPL-MCNC: 25 MG/DL (ref 6–23)
BUN SERPL-MCNC: 27 MG/DL (ref 6–23)
BUN SERPL-MCNC: 33 MG/DL (ref 6–23)
C ALBICANS DNA BLD POS QL NAA+NON-PROBE: NOT DETECTED
C AURIS DNA BLD POS QL NAA+NON-PROBE: NOT DETECTED
C GATTII+NEOFOR DNA BLD POS QL NAA+N-PRB: NOT DETECTED
C GLABRATA DNA BLD POS QL NAA+NON-PROBE: NOT DETECTED
C KRUSEI DNA BLD POS QL NAA+NON-PROBE: NOT DETECTED
C PARAP DNA BLD POS QL NAA+NON-PROBE: NOT DETECTED
C TROPICLS DNA BLD POS QL NAA+NON-PROBE: NOT DETECTED
CALCIUM SERPL-MCNC: 8.3 MG/DL (ref 8.6–10.2)
CALCIUM SERPL-MCNC: 8.5 MG/DL (ref 8.6–10.2)
CALCIUM SERPL-MCNC: 8.6 MG/DL (ref 8.6–10.2)
CALCIUM SERPL-MCNC: 8.6 MG/DL (ref 8.6–10.2)
CALCIUM SERPL-MCNC: 8.8 MG/DL (ref 8.6–10.2)
CHLORIDE SERPL-SCNC: 117 MMOL/L (ref 98–107)
CHLORIDE SERPL-SCNC: 118 MMOL/L (ref 98–107)
CHLORIDE SERPL-SCNC: 119 MMOL/L (ref 98–107)
CHLORIDE SERPL-SCNC: 119 MMOL/L (ref 98–107)
CHLORIDE SERPL-SCNC: 120 MMOL/L (ref 98–107)
CHLORIDE UR-SCNC: 60 MMOL/L
CO2 SERPL-SCNC: 25 MMOL/L (ref 22–29)
CO2 SERPL-SCNC: 26 MMOL/L (ref 22–29)
CO2 SERPL-SCNC: 31 MMOL/L (ref 22–29)
CO2 SERPL-SCNC: 33 MMOL/L (ref 22–29)
CO2 SERPL-SCNC: 34 MMOL/L (ref 22–29)
CREAT SERPL-MCNC: 0.8 MG/DL (ref 0.7–1.2)
CREAT SERPL-MCNC: 0.9 MG/DL (ref 0.7–1.2)
CREAT SERPL-MCNC: 0.9 MG/DL (ref 0.7–1.2)
CREAT UR-MCNC: 65.4 MG/DL (ref 40–278)
E CLOAC COMP DNA BLD POS NAA+NON-PROBE: NOT DETECTED
E COLI DNA BLD POS QL NAA+NON-PROBE: NOT DETECTED
E FAECALIS DNA BLD POS QL NAA+NON-PROBE: DETECTED
E FAECIUM DNA BLD POS QL NAA+NON-PROBE: NOT DETECTED
EKG ATRIAL RATE: 83 BPM
EKG Q-T INTERVAL: 402 MS
EKG QRS DURATION: 86 MS
EKG QTC CALCULATION (BAZETT): 469 MS
EKG R AXIS: 13 DEGREES
EKG T AXIS: -72 DEGREES
EKG VENTRICULAR RATE: 82 BPM
ENTEROBACTERALES DNA BLD POS NAA+N-PRB: NOT DETECTED
GFR, ESTIMATED: 89 ML/MIN/1.73M2
GFR, ESTIMATED: 89 ML/MIN/1.73M2
GFR, ESTIMATED: >90 ML/MIN/1.73M2
GLUCOSE SERPL-MCNC: 108 MG/DL (ref 74–99)
GLUCOSE SERPL-MCNC: 119 MG/DL (ref 74–99)
GLUCOSE SERPL-MCNC: 154 MG/DL (ref 74–99)
GLUCOSE SERPL-MCNC: 156 MG/DL (ref 74–99)
GLUCOSE SERPL-MCNC: 162 MG/DL (ref 74–99)
GP B STREP DNA BLD POS QL NAA+NON-PROBE: NOT DETECTED
HAEM INFLU DNA BLD POS QL NAA+NON-PROBE: NOT DETECTED
K OXYTOCA DNA BLD POS QL NAA+NON-PROBE: NOT DETECTED
KLEBSIELLA SP DNA BLD POS QL NAA+NON-PRB: NOT DETECTED
KLEBSIELLA SP DNA BLD POS QL NAA+NON-PRB: NOT DETECTED
L MONOCYTOG DNA BLD POS QL NAA+NON-PROBE: NOT DETECTED
MICROORGANISM SPEC CULT: ABNORMAL
MICROORGANISM/AGENT SPEC: ABNORMAL
N MEN DNA BLD POS QL NAA+NON-PROBE: NOT DETECTED
P AERUGINOSA DNA BLD POS NAA+NON-PROBE: NOT DETECTED
POTASSIUM SERPL-SCNC: 3.2 MMOL/L (ref 3.5–5)
POTASSIUM SERPL-SCNC: 3.2 MMOL/L (ref 3.5–5)
POTASSIUM SERPL-SCNC: 3.5 MMOL/L (ref 3.5–5)
POTASSIUM SERPL-SCNC: 3.5 MMOL/L (ref 3.5–5)
POTASSIUM SERPL-SCNC: 4.4 MMOL/L (ref 3.5–5)
PROTEUS SP DNA BLD POS QL NAA+NON-PROBE: NOT DETECTED
S AUREUS DNA BLD POS QL NAA+NON-PROBE: NOT DETECTED
S AUREUS+CONS DNA BLD POS NAA+NON-PROBE: NOT DETECTED
S EPIDERMIDIS DNA BLD POS QL NAA+NON-PRB: NOT DETECTED
S LUGDUNENSIS DNA BLD POS QL NAA+NON-PRB: NOT DETECTED
S MALTOPHILIA DNA BLD POS QL NAA+NON-PRB: NOT DETECTED
S MARCESCENS DNA BLD POS NAA+NON-PROBE: NOT DETECTED
S PNEUM DNA BLD POS QL NAA+NON-PROBE: NOT DETECTED
S PYO DNA BLD POS QL NAA+NON-PROBE: NOT DETECTED
SALMONELLA DNA BLD POS QL NAA+NON-PROBE: NOT DETECTED
SERVICE CMNT-IMP: ABNORMAL
SODIUM SERPL-SCNC: 153 MMOL/L (ref 132–146)
SODIUM SERPL-SCNC: 153 MMOL/L (ref 132–146)
SODIUM SERPL-SCNC: 154 MMOL/L (ref 132–146)
SODIUM SERPL-SCNC: 156 MMOL/L (ref 132–146)
SODIUM SERPL-SCNC: 156 MMOL/L (ref 132–146)
SODIUM UR-SCNC: 63 MMOL/L
SPECIMEN DESCRIPTION: ABNORMAL
STREPTOCOCCUS DNA BLD POS NAA+NON-PROBE: NOT DETECTED
UUN UR-MCNC: 902 MG/DL (ref 800–1666)
VANA+VANB ISLT/SPM QL: NOT DETECTED

## 2025-03-02 PROCEDURE — 2580000003 HC RX 258: Performed by: INTERNAL MEDICINE

## 2025-03-02 PROCEDURE — 82436 ASSAY OF URINE CHLORIDE: CPT

## 2025-03-02 PROCEDURE — 87040 BLOOD CULTURE FOR BACTERIA: CPT

## 2025-03-02 PROCEDURE — 74176 CT ABD & PELVIS W/O CONTRAST: CPT

## 2025-03-02 PROCEDURE — 82570 ASSAY OF URINE CREATININE: CPT

## 2025-03-02 PROCEDURE — 36415 COLL VENOUS BLD VENIPUNCTURE: CPT

## 2025-03-02 PROCEDURE — 80048 BASIC METABOLIC PNL TOTAL CA: CPT

## 2025-03-02 PROCEDURE — 76775 US EXAM ABDO BACK WALL LIM: CPT

## 2025-03-02 PROCEDURE — 6360000002 HC RX W HCPCS: Performed by: SPECIALIST

## 2025-03-02 PROCEDURE — 2060000000 HC ICU INTERMEDIATE R&B

## 2025-03-02 PROCEDURE — 6360000002 HC RX W HCPCS

## 2025-03-02 PROCEDURE — 2580000003 HC RX 258

## 2025-03-02 PROCEDURE — 84540 ASSAY OF URINE/UREA-N: CPT

## 2025-03-02 PROCEDURE — 84300 ASSAY OF URINE SODIUM: CPT

## 2025-03-02 PROCEDURE — 2500000003 HC RX 250 WO HCPCS: Performed by: SPECIALIST

## 2025-03-02 PROCEDURE — 2700000000 HC OXYGEN THERAPY PER DAY

## 2025-03-02 RX ORDER — SODIUM CHLORIDE 0.9 % (FLUSH) 0.9 %
5-40 SYRINGE (ML) INJECTION EVERY 12 HOURS SCHEDULED
Status: DISCONTINUED | OUTPATIENT
Start: 2025-03-02 | End: 2025-03-04 | Stop reason: HOSPADM

## 2025-03-02 RX ORDER — SODIUM CHLORIDE 0.9 % (FLUSH) 0.9 %
5-40 SYRINGE (ML) INJECTION PRN
Status: DISCONTINUED | OUTPATIENT
Start: 2025-03-02 | End: 2025-03-04 | Stop reason: HOSPADM

## 2025-03-02 RX ORDER — SODIUM CHLORIDE 9 MG/ML
INJECTION, SOLUTION INTRAVENOUS PRN
Status: DISCONTINUED | OUTPATIENT
Start: 2025-03-02 | End: 2025-03-04 | Stop reason: HOSPADM

## 2025-03-02 RX ORDER — LIDOCAINE HYDROCHLORIDE 10 MG/ML
50 INJECTION, SOLUTION EPIDURAL; INFILTRATION; INTRACAUDAL; PERINEURAL ONCE
Status: DISCONTINUED | OUTPATIENT
Start: 2025-03-02 | End: 2025-03-04 | Stop reason: HOSPADM

## 2025-03-02 RX ADMIN — DEXTROSE MONOHYDRATE: 50 INJECTION, SOLUTION INTRAVENOUS at 13:51

## 2025-03-02 RX ADMIN — Medication 10 ML: at 22:01

## 2025-03-02 RX ADMIN — DEXTROSE MONOHYDRATE: 50 INJECTION, SOLUTION INTRAVENOUS at 00:28

## 2025-03-02 RX ADMIN — HEPARIN SODIUM 5000 UNITS: 5000 INJECTION INTRAVENOUS; SUBCUTANEOUS at 13:48

## 2025-03-02 RX ADMIN — HEPARIN SODIUM 5000 UNITS: 5000 INJECTION INTRAVENOUS; SUBCUTANEOUS at 05:57

## 2025-03-02 RX ADMIN — WATER 2000 MG: 1 INJECTION INTRAMUSCULAR; INTRAVENOUS; SUBCUTANEOUS at 18:54

## 2025-03-02 RX ADMIN — SODIUM CHLORIDE, PRESERVATIVE FREE 10 ML: 5 INJECTION INTRAVENOUS at 18:55

## 2025-03-02 RX ADMIN — VANCOMYCIN HYDROCHLORIDE 1250 MG: 1 INJECTION, POWDER, LYOPHILIZED, FOR SOLUTION INTRAVENOUS at 10:30

## 2025-03-02 RX ADMIN — HEPARIN SODIUM 5000 UNITS: 5000 INJECTION INTRAVENOUS; SUBCUTANEOUS at 22:01

## 2025-03-02 RX ADMIN — WATER 2000 MG: 1 INJECTION INTRAMUSCULAR; INTRAVENOUS; SUBCUTANEOUS at 05:57

## 2025-03-02 ASSESSMENT — PAIN SCALES - WONG BAKER
WONGBAKER_NUMERICALRESPONSE: NO HURT
WONGBAKER_NUMERICALRESPONSE: NO HURT

## 2025-03-03 ENCOUNTER — APPOINTMENT (OUTPATIENT)
Dept: GENERAL RADIOLOGY | Age: 75
DRG: 871 | End: 2025-03-03
Payer: MEDICARE

## 2025-03-03 LAB
ANION GAP SERPL CALCULATED.3IONS-SCNC: 5 MMOL/L (ref 7–16)
BUN SERPL-MCNC: 22 MG/DL (ref 6–23)
CALCIUM SERPL-MCNC: 8.5 MG/DL (ref 8.6–10.2)
CHLORIDE SERPL-SCNC: 121 MMOL/L (ref 98–107)
CO2 SERPL-SCNC: 32 MMOL/L (ref 22–29)
CREAT SERPL-MCNC: 0.8 MG/DL (ref 0.7–1.2)
GFR, ESTIMATED: >90 ML/MIN/1.73M2
GLUCOSE SERPL-MCNC: 112 MG/DL (ref 74–99)
MICROORGANISM SPEC CULT: NORMAL
POTASSIUM SERPL-SCNC: 3.3 MMOL/L (ref 3.5–5)
SERVICE CMNT-IMP: NORMAL
SODIUM SERPL-SCNC: 158 MMOL/L (ref 132–146)
SPECIMEN DESCRIPTION: NORMAL

## 2025-03-03 PROCEDURE — 6360000002 HC RX W HCPCS: Performed by: SPECIALIST

## 2025-03-03 PROCEDURE — 2720000010 HC SURG SUPPLY STERILE

## 2025-03-03 PROCEDURE — 36415 COLL VENOUS BLD VENIPUNCTURE: CPT

## 2025-03-03 PROCEDURE — 6360000002 HC RX W HCPCS

## 2025-03-03 PROCEDURE — 76937 US GUIDE VASCULAR ACCESS: CPT

## 2025-03-03 PROCEDURE — 99232 SBSQ HOSP IP/OBS MODERATE 35: CPT | Performed by: NURSE PRACTITIONER

## 2025-03-03 PROCEDURE — 71045 X-RAY EXAM CHEST 1 VIEW: CPT

## 2025-03-03 PROCEDURE — 2580000003 HC RX 258: Performed by: SPECIALIST

## 2025-03-03 PROCEDURE — 80048 BASIC METABOLIC PNL TOTAL CA: CPT

## 2025-03-03 PROCEDURE — 2060000000 HC ICU INTERMEDIATE R&B

## 2025-03-03 PROCEDURE — C1751 CATH, INF, PER/CENT/MIDLINE: HCPCS

## 2025-03-03 PROCEDURE — 2580000003 HC RX 258: Performed by: INTERNAL MEDICINE

## 2025-03-03 PROCEDURE — 36569 INSJ PICC 5 YR+ W/O IMAGING: CPT

## 2025-03-03 PROCEDURE — 2700000000 HC OXYGEN THERAPY PER DAY

## 2025-03-03 PROCEDURE — 02HV33Z INSERTION OF INFUSION DEVICE INTO SUPERIOR VENA CAVA, PERCUTANEOUS APPROACH: ICD-10-PCS | Performed by: INTERNAL MEDICINE

## 2025-03-03 PROCEDURE — 2500000003 HC RX 250 WO HCPCS: Performed by: SPECIALIST

## 2025-03-03 RX ORDER — LORAZEPAM 0.5 MG/1
0.5 TABLET ORAL EVERY 4 HOURS PRN
Status: DISCONTINUED | OUTPATIENT
Start: 2025-03-03 | End: 2025-03-04 | Stop reason: HOSPADM

## 2025-03-03 RX ORDER — MORPHINE SULFATE 20 MG/ML
10 SOLUTION ORAL
Status: DISCONTINUED | OUTPATIENT
Start: 2025-03-03 | End: 2025-03-04 | Stop reason: HOSPADM

## 2025-03-03 RX ADMIN — DEXTROSE MONOHYDRATE: 50 INJECTION, SOLUTION INTRAVENOUS at 23:19

## 2025-03-03 RX ADMIN — AMPICILLIN SODIUM 2000 MG: 2 INJECTION, POWDER, FOR SOLUTION INTRAMUSCULAR; INTRAVENOUS at 23:23

## 2025-03-03 RX ADMIN — AMPICILLIN SODIUM 2000 MG: 2 INJECTION, POWDER, FOR SOLUTION INTRAMUSCULAR; INTRAVENOUS at 20:21

## 2025-03-03 RX ADMIN — Medication 10 ML: at 21:21

## 2025-03-03 RX ADMIN — HEPARIN SODIUM 5000 UNITS: 5000 INJECTION INTRAVENOUS; SUBCUTANEOUS at 06:45

## 2025-03-03 RX ADMIN — HEPARIN SODIUM 5000 UNITS: 5000 INJECTION INTRAVENOUS; SUBCUTANEOUS at 20:15

## 2025-03-03 RX ADMIN — HEPARIN SODIUM 5000 UNITS: 5000 INJECTION INTRAVENOUS; SUBCUTANEOUS at 15:22

## 2025-03-03 RX ADMIN — WATER 2000 MG: 1 INJECTION INTRAMUSCULAR; INTRAVENOUS; SUBCUTANEOUS at 17:32

## 2025-03-03 RX ADMIN — DEXTROSE MONOHYDRATE: 50 INJECTION, SOLUTION INTRAVENOUS at 17:36

## 2025-03-03 RX ADMIN — AMPICILLIN SODIUM 2000 MG: 2 INJECTION, POWDER, FOR SOLUTION INTRAMUSCULAR; INTRAVENOUS at 17:33

## 2025-03-03 ASSESSMENT — PAIN SCALES - WONG BAKER: WONGBAKER_NUMERICALRESPONSE: NO HURT

## 2025-03-03 NOTE — DISCHARGE INSTR - COC
gauze 03/01/25 1639   Wound Length (cm) 5 cm 03/01/25 1639   Wound Width (cm) 3 cm 03/01/25 1639   Wound Depth (cm) 0.1 cm 03/01/25 1639   Wound Surface Area (cm^2) 15 cm^2 03/01/25 1639   Wound Volume (cm^3) 1.5 cm^3 03/01/25 1639   Wound Assessment Pink/red 03/01/25 1639   Drainage Amount Scant (moist but unmeasurable) 03/01/25 1639   Odor None 03/01/25 1639   Sanjana-wound Assessment Fragile 03/01/25 1639   Number of days:        Wound Axilla Left (Active)   Wound Image   03/01/25 1639   Wound Etiology Skin Tear 03/01/25 1639   Dressing Status New dressing applied 03/01/25 1639   Wound Cleansed Cleansed with saline 03/01/25 1639   Dressing/Treatment Xeroform;Foam 03/01/25 1639   Wound Length (cm) 4 cm 03/01/25 1639   Wound Width (cm) 5 cm 03/01/25 1639   Wound Depth (cm) 0.1 cm 03/01/25 1639   Wound Surface Area (cm^2) 20 cm^2 03/01/25 1639   Wound Volume (cm^3) 2 cm^3 03/01/25 1639   Wound Assessment Pink/red 03/01/25 1639   Drainage Amount Scant (moist but unmeasurable) 03/01/25 1639   Drainage Description Serous 03/01/25 1639   Odor None 03/01/25 1639   Sanjana-wound Assessment Dry/flaky 03/01/25 1639   Number of days:        Wound Back Mid (Active)   Wound Image   03/01/25 1639   Wound Etiology Skin Tear 03/01/25 1639   Dressing Status New dressing applied 03/01/25 1639   Wound Cleansed Cleansed with saline 03/01/25 1639   Dressing/Treatment Xeroform;Foam 03/01/25 1639   Wound Length (cm) 1.5 cm 03/01/25 1639   Wound Width (cm) 2 cm 03/01/25 1639   Wound Depth (cm) 0.1 cm 03/01/25 1639   Wound Surface Area (cm^2) 3 cm^2 03/01/25 1639   Wound Volume (cm^3) 0.3 cm^3 03/01/25 1639   Wound Assessment Pink/red 03/01/25 1639   Drainage Amount None (dry) 03/01/25 1639   Odor None 03/01/25 1639   Sanjana-wound Assessment Fragile;Dry/flaky 03/01/25 1639   Number of days:        Wound Back Right (Active)   Wound Image   03/01/25 1639   Wound Etiology Deep tissue/Injury 03/01/25 1639   Dressing Status Other (Comment) 03/01/25

## 2025-03-03 NOTE — PROCEDURES
PROCEDURE NOTE  Date: 3/3/2025   Name: Faisal Pathak  YOB: 1950    Procedures DL Power PICC                 Placement 3/3/2025    Product number: [id number]   Lot Number: [id number]   Consult:  home antibiotics   Ultrasound: yes   R Brachial ::\"R Brachial\",\"L Brachial\", \"R Basilic\", L Basilic\", R Cephalic\", L Cephalic\".      Upper Arm Circumference: 32CM    Size: 5.5F/55CM    Exposed Length: 0    Internal Length: 41CM   Cut: 14CM   Vein Measurement: 0.53CM    Xray for placement verification  [place] in left chest, placed in right arm  Read as in the mid SVC, pt confused and agitated, 2 assistants needed for placement  Brisk blood return noted times 2, CHG tegaderm dressing reinforced for small amt of oozing at insertion site.  RN called to state okay to use, no answer at desk, will try back later.  Beth Henderson RN  3/3/2025  5:16 PM

## 2025-03-03 NOTE — CARE COORDINATION
3/3/25 1527 CM note: (+) RSV 2/26/25, bl cx 3/2/25 ngtd. 4L nc. Discharge order noted. Plan to discharge to SNF with hospice services; however family does not want patient to return to [facility]. Pts dtr is requesting dementia unit 1) [facility]- per Pamela no male bed open  2) [facility]- per Akilah they will have a private room at the [place] tomorrow, not on their dementia unit but spoke with pts dtr Bhavna and she is agreeable. PATRICK is signed. Will need to complete HENS. [facility] uses [place] hospice and family is agreeable to this agency. Akilah states she will make referral to hospice liaison. CM will follow. Electronically signed by Maryanne Demarco RN on 3/3/2025 at 3:36 PM

## 2025-03-03 NOTE — DISCHARGE SUMMARY
reformatted images are provided for review. Automated exposure control, iterative reconstruction, and/or weight based adjustment of the mA/kV was utilized to reduce the radiation dose to as low as reasonably achievable. COMPARISON: None. HISTORY: ORDERING SYSTEM PROVIDED HISTORY: abscess TECHNOLOGIST PROVIDED HISTORY: Reason for exam:->abscess Additional Contrast?->None FINDINGS: Lower Chest: There findings of a prior median sternotomy and mild cardiomegaly.  Electrodes are noted in the right heart.  There is a small left and trace right pleural effusion.  Dependent atelectasis is present.  No focal consolidation. Organs: Liver and spleen are normal in size without focal lesion. Gallbladder and bile ducts appear normal.  Pancreas demonstrates mild atrophy.  No pancreatic masses.  Normal adrenal glands.  Kidneys demonstrate perinephric stranding which is a nonspecific finding.  There is an exophytic lower pole cyst on the right measuring 2.3 cm in diameter with an attenuation of 11 HU. GI/Bowel: Moderate stool throughout the colon.  No acute colonic inflammation.  Moderate to large amount of desiccated stool in the rectal vault, fecal impaction not excluded.  Normal appendix.  Normal TI.  No mesenteric adenopathy. Pelvis: There is a Paulino catheter in urinary bladder.  Bladder wall is significantly thickened and there is pericystic inflammatory change.  Small amount of gas in the bladder.  No evidence of pelvic or inguinal lymphadenopathy.  No ascites. Peritoneum/Retroperitoneum: No retroperitoneal adenopathy.  There is a fusiform infrarenal abdominal aortic aneurysm measuring 4.1 cm in diameter. No extension into the iliac vessels. Bones/Soft Tissues: There is an old superior endplate central compression fracture of L3.  Bone density is diminished.  Subcutaneous tissues appear generally unremarkable.     1. Significant bladder wall thickening and pericystic inflammatory change. Findings are consistent with cystitis.

## 2025-03-04 VITALS
SYSTOLIC BLOOD PRESSURE: 120 MMHG | DIASTOLIC BLOOD PRESSURE: 73 MMHG | HEART RATE: 71 BPM | HEIGHT: 74 IN | RESPIRATION RATE: 18 BRPM | OXYGEN SATURATION: 92 % | TEMPERATURE: 97.4 F | WEIGHT: 195.33 LBS | BODY MASS INDEX: 25.07 KG/M2

## 2025-03-04 PROCEDURE — 6370000000 HC RX 637 (ALT 250 FOR IP): Performed by: NURSE PRACTITIONER

## 2025-03-04 PROCEDURE — 6360000002 HC RX W HCPCS: Performed by: SPECIALIST

## 2025-03-04 PROCEDURE — 2500000003 HC RX 250 WO HCPCS: Performed by: SPECIALIST

## 2025-03-04 PROCEDURE — 6360000002 HC RX W HCPCS

## 2025-03-04 PROCEDURE — 2580000003 HC RX 258: Performed by: SPECIALIST

## 2025-03-04 PROCEDURE — 2580000003 HC RX 258: Performed by: INTERNAL MEDICINE

## 2025-03-04 RX ADMIN — Medication 10 ML: at 08:47

## 2025-03-04 RX ADMIN — DEXTROSE MONOHYDRATE: 50 INJECTION, SOLUTION INTRAVENOUS at 13:27

## 2025-03-04 RX ADMIN — WATER 2000 MG: 1 INJECTION INTRAMUSCULAR; INTRAVENOUS; SUBCUTANEOUS at 05:04

## 2025-03-04 RX ADMIN — DEXTROSE MONOHYDRATE: 50 INJECTION, SOLUTION INTRAVENOUS at 06:23

## 2025-03-04 RX ADMIN — HEPARIN SODIUM 5000 UNITS: 5000 INJECTION INTRAVENOUS; SUBCUTANEOUS at 11:50

## 2025-03-04 RX ADMIN — MORPHINE SULFATE 10 MG: 10 SOLUTION ORAL at 17:48

## 2025-03-04 RX ADMIN — AMPICILLIN SODIUM 2000 MG: 2 INJECTION, POWDER, FOR SOLUTION INTRAMUSCULAR; INTRAVENOUS at 11:42

## 2025-03-04 RX ADMIN — AMPICILLIN SODIUM 2000 MG: 2 INJECTION, POWDER, FOR SOLUTION INTRAMUSCULAR; INTRAVENOUS at 08:45

## 2025-03-04 RX ADMIN — HEPARIN SODIUM 5000 UNITS: 5000 INJECTION INTRAVENOUS; SUBCUTANEOUS at 05:05

## 2025-03-04 RX ADMIN — AMPICILLIN SODIUM 2000 MG: 2 INJECTION, POWDER, FOR SOLUTION INTRAMUSCULAR; INTRAVENOUS at 15:23

## 2025-03-04 RX ADMIN — SODIUM CHLORIDE: 9 INJECTION, SOLUTION INTRAVENOUS at 08:44

## 2025-03-04 RX ADMIN — WATER 2000 MG: 1 INJECTION INTRAMUSCULAR; INTRAVENOUS; SUBCUTANEOUS at 15:22

## 2025-03-04 RX ADMIN — AMPICILLIN SODIUM 2000 MG: 2 INJECTION, POWDER, FOR SOLUTION INTRAMUSCULAR; INTRAVENOUS at 04:11

## 2025-03-04 ASSESSMENT — PAIN SCALES - WONG BAKER: WONGBAKER_NUMERICALRESPONSE: NO HURT

## 2025-03-04 NOTE — CARE COORDINATION
3/4/25 1113 CM note: (+) RSV 2/26/25, bl cx 3/2/25 ngtd. 2L nc. Discharge order noted. Plan to discharge to [facility] today with [facility]- awaiting bed availability which is expected today pending a discharge. PATRICK is signed. Akilah, [facility] liaison, to obtain [facility] from [facility]. Placed ambulance transport on WILL CALL with [facility]. Ambulance form on soft chart. CM will follow. Electronically signed by Maryanne Demarco RN on 3/4/2025 at 11:20 AM     Update: 3/4/25 1330 Arranged ambulance transport with [facility] for  at 5:00. Ambulance form on soft chart. Notified pts dtr Bhavna, pts RN Carolyne, charge nurse Paula, and [facility] liaison Akilah of  time. Notified Akilah of pts current O2 needs at 2L nc. Electronically signed by Maryanne Demarco RN on 3/4/2025 at 1:30 PM

## 2025-03-04 NOTE — PLAN OF CARE
Problem: Discharge Planning  Goal: Discharge to home or other facility with appropriate resources  2/28/2025 0130 by Bright Jones, RN  Outcome: Progressing  2/27/2025 1839 by Padma Yanez, RN  Outcome: Progressing  Flowsheets (Taken 2/27/2025 1652)  Discharge to home or other facility with appropriate resources: Identify barriers to discharge with patient and caregiver     Problem: Pain  Goal: Verbalizes/displays adequate comfort level or baseline comfort level  2/28/2025 0130 by Bright Jones, RN  Outcome: Progressing  2/27/2025 1839 by Padma Yanez, RN  Outcome: Progressing     Problem: Safety - Adult  Goal: Free from fall injury  2/28/2025 0130 by Bright Jones, RN  Outcome: Progressing  2/27/2025 1839 by Padma Yanez, RN  Outcome: Progressing     Problem: ABCDS Injury Assessment  Goal: Absence of physical injury  2/28/2025 0130 by Bright Jones, RN  Outcome: Progressing  2/27/2025 1839 by Padma Yanez, RN  Outcome: Progressing     
  Problem: Discharge Planning  Goal: Discharge to home or other facility with appropriate resources  3/3/2025 1029 by Carolyne Mcduffie RN  Outcome: Progressing     Problem: Pain  Goal: Verbalizes/displays adequate comfort level or baseline comfort level  3/3/2025 1029 by Carolyne Mcduffie RN  Outcome: Progressing     Problem: Safety - Adult  Goal: Free from fall injury  3/3/2025 1029 by Carolyne Mcduffie RN  Outcome: Progressing     Problem: ABCDS Injury Assessment  Goal: Absence of physical injury  3/3/2025 1029 by Carolyne Mcduffie RN  Outcome: Progressing     Problem: Nutrition Deficit:  Goal: Optimize nutritional status  3/3/2025 1029 by Carolyne Mcduffie RN  Outcome: Progressing     
  Problem: Discharge Planning  Goal: Discharge to home or other facility with appropriate resources  Outcome: Adequate for Discharge  Flowsheets (Taken 2/28/2025 0913)  Discharge to home or other facility with appropriate resources: Identify barriers to discharge with patient and caregiver     Problem: Pain  Goal: Verbalizes/displays adequate comfort level or baseline comfort level  Outcome: Adequate for Discharge  Flowsheets (Taken 2/28/2025 0915)  Verbalizes/displays adequate comfort level or baseline comfort level:   Encourage patient to monitor pain and request assistance   Assess pain using appropriate pain scale     Problem: Safety - Adult  Goal: Free from fall injury  Outcome: Adequate for Discharge  Flowsheets (Taken 2/28/2025 1130)  Free From Fall Injury: Instruct family/caregiver on patient safety     Problem: ABCDS Injury Assessment  Goal: Absence of physical injury  Outcome: Adequate for Discharge  Flowsheets (Taken 2/28/2025 1130)  Absence of Physical Injury: Implement safety measures based on patient assessment     
  Problem: Discharge Planning  Goal: Discharge to home or other facility with appropriate resources  Outcome: Progressing     Problem: Pain  Goal: Verbalizes/displays adequate comfort level or baseline comfort level  Outcome: Progressing     Problem: Safety - Adult  Goal: Free from fall injury  Outcome: Progressing     Problem: ABCDS Injury Assessment  Goal: Absence of physical injury  Outcome: Progressing     Problem: Nutrition Deficit:  Goal: Optimize nutritional status  Outcome: Progressing     
  Problem: Discharge Planning  Goal: Discharge to home or other facility with appropriate resources  Outcome: Progressing     Problem: Pain  Goal: Verbalizes/displays adequate comfort level or baseline comfort level  Outcome: Progressing     Problem: Safety - Adult  Goal: Free from fall injury  Outcome: Progressing     Problem: ABCDS Injury Assessment  Goal: Absence of physical injury  Outcome: Progressing     Problem: Nutrition Deficit:  Goal: Optimize nutritional status  Outcome: Progressing     Problem: Confusion  Goal: Confusion, delirium, dementia, or psychosis is improved or at baseline  Description: INTERVENTIONS:  1. Assess for possible contributors to thought disturbance, including medications, impaired vision or hearing, underlying metabolic abnormalities, dehydration, psychiatric diagnoses, and notify attending LIP  2. [place] high risk fall precautions, as indicated  3. Provide frequent short contacts to provide reality reorientation, refocusing and direction  4. Decrease environmental stimuli, including noise as appropriate  5. Monitor and intervene to maintain adequate nutrition, hydration, elimination, sleep and activity  6. If unable to ensure safety without constant attention obtain sitter and review sitter guidelines with assigned personnel  7. Initiate Psychosocial CNS and Spiritual Care consult, as indicated  Outcome: Progressing     
  Problem: Discharge Planning  Goal: Discharge to home or other facility with appropriate resources  Outcome: Progressing     Problem: Pain  Goal: Verbalizes/displays adequate comfort level or baseline comfort level  Outcome: Progressing     Problem: Safety - Adult  Goal: Free from fall injury  Outcome: Progressing     Problem: ABCDS Injury Assessment  Goal: Absence of physical injury  Outcome: Progressing     Problem: Nutrition Deficit:  Goal: Optimize nutritional status  Outcome: Progressing     Problem: Confusion  Goal: Confusion, delirium, dementia, or psychosis is improved or at baseline  Description: INTERVENTIONS:  1. Assess for possible contributors to thought disturbance, including medications, impaired vision or hearing, underlying metabolic abnormalities, dehydration, psychiatric diagnoses, and notify attending LIP  2. [place] high risk fall precautions, as indicated  3. Provide frequent short contacts to provide reality reorientation, refocusing and direction  4. Decrease environmental stimuli, including noise as appropriate  5. Monitor and intervene to maintain adequate nutrition, hydration, elimination, sleep and activity  6. If unable to ensure safety without constant attention obtain sitter and review sitter guidelines with assigned personnel  7. Initiate Psychosocial CNS and Spiritual Care consult, as indicated  Outcome: Progressing     
  Problem: Discharge Planning  Goal: Discharge to home or other facility with appropriate resources  Outcome: Progressing  Flowsheets (Taken 2/27/2025 0457)  Discharge to home or other facility with appropriate resources: Identify barriers to discharge with patient and caregiver     Problem: Pain  Goal: Verbalizes/displays adequate comfort level or baseline comfort level  Outcome: Progressing     Problem: Safety - Adult  Goal: Free from fall injury  Outcome: Progressing     Problem: ABCDS Injury Assessment  Goal: Absence of physical injury  Outcome: Progressing     
by Carolyne Mcduffie RN  Outcome: Not Progressing  3/4/2025 0145 by Silvia Pemberton RN  Outcome: Progressing     Problem: Nutrition Deficit:  Goal: Optimize nutritional status  3/4/2025 1216 by Carolyne Mcduffie RN  Outcome: Not Progressing  3/4/2025 0145 by Silvai Pemberton RN  Outcome: Progressing     Problem: Confusion  Goal: Confusion, delirium, dementia, or psychosis is improved or at baseline  Description: INTERVENTIONS:  1. Assess for possible contributors to thought disturbance, including medications, impaired vision or hearing, underlying metabolic abnormalities, dehydration, psychiatric diagnoses, and notify attending LIP  2. [place] high risk fall precautions, as indicated  3. Provide frequent short contacts to provide reality reorientation, refocusing and direction  4. Decrease environmental stimuli, including noise as appropriate  5. Monitor and intervene to maintain adequate nutrition, hydration, elimination, sleep and activity  6. If unable to ensure safety without constant attention obtain sitter and review sitter guidelines with assigned personnel  7. Initiate Psychosocial CNS and Spiritual Care consult, as indicated  3/4/2025 1216 by Carolyne Mcduffie RN  Outcome: Not Progressing  3/4/2025 0145 by Silvia Pemberton RN  Outcome: Progressing

## 2025-03-05 NOTE — PROGRESS NOTES
Patient - Faisal Pathak,  Age - 74 y.o.    - 1950      Room Number - 0521/0521-01   N -  [id number]   [facility] # - [id number]  Date of Admission -  2025  2:10 PM    Problem list of patient:     Hospital Problems             Last Modified POA    * (Principal) Sepsis due to urinary tract infection (HCC) 2025 Yes     ASSESSMENT/PLAN   E faecalis septicemia due to gu vs gi source   Fevers better    Eval for IE has murmur and pacer TTE first +/-SANA   Angelo renal us    Check CT a/p    AMP/CEFTRIAXONE IS PLANNED   Rsv  Dehydration hypernatremia   Suggest consulting wound care/ostomy nurse-SEEN TODAY   Continue wound care  Encourage nutritional support  Continue to off load wound/pressure relief bed        Infection Control Recommendations   Universal Precautions RSV DROPLET   Rust Catheter RUST   Central Line  Wound care        Coordination of Outpatient Care:   Estimated Length of  antimicrobials: MINIMUM 2  Patient will need Midline Catheter Insertion or  PICC line Insertion:TBD  Patient will need: Home IV , Infusion Center,  SNF,  LTAC: TBD  Patient will need outpatient wound care:     Please note that 30 minutes were spent on this case in regards to the intensity and complexity inherent to hospital inpatient or observation care associated with a confirmed or suspected infectious disease.  This included education to patient/representative and/for hospital staff in regards to disease transmission risk assessment and mitigation, public health investigation, analysis and testing, and complex antimicrobial therapy counseling and treatment.    SUBJECTIVE:   DOS:  25  IN BED  AFEBRILE 2l MORE AWAKE MORE AGGRESSIVE NAD     OBJECTIVE   VITALS    height is 1.88 m (6' 2\") and weight is 88.6 kg (195 lb 5.2 oz). His temperature is 97.3 °F (36.3 °C). His blood pressure is 127/79 and his pulse is 71. His respiration is 20 and oxygen 
                  Nephrology Note  Florin Hays MD          Patient seen and examined.  No family member is present at the bedside.  Chart reviewed.  Patient is being discharged under hospice care.  Patient is somewhat more somnolent today.  .   In no acute distress.    Vital SignsBP (!) 148/74   Pulse 70   Temp 97.2 °F (36.2 °C) (Axillary)   Resp 20   Ht 1.88 m (6' 2\")   Wt 88.6 kg (195 lb 5.2 oz)   SpO2 97%   BMI 25.08 kg/m²   24HR INTAKE/OUTPUT:    Intake/Output Summary (Last 24 hours) at 3/3/2025 1504  Last data filed at 3/3/2025 0639  Gross per 24 hour   Intake 10 ml   Output 1100 ml   Net -1090 ml         PHYSICAL EXAM        Neck: No JVD  Lungs: Breath sounds decreased at the bases. No rales or ronchi.  Heart: Regular rate and rhythm. No S3 gallop. No  murmrur.  Abdomen: Soft non distended, non tender and normal bowel sounds.  Extremeties:   No edema.           Current Facility-Administered Medications   Medication Dose Route Frequency Provider Last Rate Last Admin    morphine 20MG/ML concentrated solution 10 mg  10 mg Oral Q2H PRN Gracia Larios, APRN - CNP        LORazepam (ATIVAN) tablet 0.5 mg  0.5 mg Oral Q4H PRN Gracia Larios APRN - CNP        ampicillin (OMNIPEN) 2,000 mg in sodium chloride 0.9 % 100 mL IVPB (addEASE)  2,000 mg IntraVENous 6 times per day Gale Daniels MD        sodium chloride flush 0.9 % injection 5-40 mL  5-40 mL IntraVENous 2 times per day Gale Daniels MD   10 mL at 03/02/25 2201    sodium chloride flush 0.9 % injection 5-40 mL  5-40 mL IntraVENous PRN Gale Daniels MD   10 mL at 03/02/25 1855    0.9 % sodium chloride infusion   IntraVENous PRN Gale Daniels MD        lidocaine PF 1 % injection 50 mg  50 mg IntraDERmal Once Gale Daniels MD        dextrose 5 % solution   IntraVENous Continuous Ryhal, Mustapha LOPEZ  mL/hr at 03/02/25 1351 New Bag at 03/02/25 1351    cefTRIAXone (ROCEPHIN) 2,000 mg in sterile water 20 mL IV syringe  2,000 mg IntraVENous 
  Palliative Care Department  [phone]  Palliative Care Progress Note  Provider JADE Blevins CNP    Faisal Pathak  [id number]  Hospital Day: 6  Date of Initial Consult: 2/26/2025  Referring Provider: AVILA Hi   Palliative Medicine was consulted for assistance with: \"goals of care, progressive dementia with frequent UTIs and lives at [facility]\"    HPI:   Faisal Pathak is a 74 y.o. with a past medical history of sinus sick syndrome, hyperlipidemia, Alzheimer's disease, CAD s/p CABG, atrial fibrillation who was admitted on 2/26/2025 from [facility] with a CHIEF COMPLAINT of altered mental status.  Patient was reportedly less responsive at nursing facility and was also febrile.  He had a recent hospitalization for influenza.  He was found to be hyponatremic with a sodium of 157, chloride 114, BUN 69, creatinine 2.1, lactic acid 2.1-2.0, procalcitonin 0.79, proBNP 4983, troponin 80-68.  Respiratory panel was positive for RSV.  He was negative for COVID-19 and influenza.  He was also positive for UTI.  Chest x-ray showed interval worsening and development of atelectasis and volume loss in the left lower lung.  He is being followed by nephrology for DANIEL.  Palliative care was consulted for goals of care discussion.    ASSESSMENT/PLAN:     Pertinent Hospital Diagnoses     Sepsis due to UTI  DANIEL  Hypernatremia  Alzheimer's disease  Debility      Palliative Care Encounter / Counseling Regarding Goals of Care  Please see detailed goals of care discussion as below  At this time, Faisal Pathak, Does Not have capacity for medical decision-making.  Capacity is time limited and situation/question specific  During encounter Daphne was surrogate medical decision-maker  Outcome of goals of care meeting:   Comfort measures  Hospice consult   Code status DNR-CC  Advanced Directives: no POA or living will in [facility]  Surrogate/Legal NOK:  Daphne Errol (daughter): [phone]  Adama Pathak (son): 
  Physician Progress Note      PATIENT:               MIGEL RICHARDS  CSN #:                  [id number]  :                       1950  ADMIT DATE:       2025 2:10 PM  DISCH DATE:        3/4/2025 6:15 PM  RESPONDING  PROVIDER #:        Mustapha Sims DO          QUERY TEXT:    Patient admitted with Sepsis, noted to have chronic atrial fibrillation and is   maintained on Eliquis. If possible, please document in progress notes and   discharge summary if you are evaluating and/or treating any of the following:?  ?  The medical record reflects the following:  Risk Factors: DANIEL, GERD, Afib  Clinical Indicators: Afib, Age, HLD, Obesity,  Treatment: Eliquis 2.5 mg po b.i.d.    Thank you  Trang Booker RN, BSN, [facility]  [phone]  Options provided:  -- Secondary hypercoagulable state in a patient with atrial fibrillation  -- Other - I will add my own diagnosis  -- Disagree - Not applicable / Not valid  -- Disagree - Clinically unable to determine / Unknown  -- Refer to Clinical Documentation Reviewer    PROVIDER RESPONSE TEXT:    This patient has secondary hypercoagulable state in a patient with atrial   fibrillation.    Query created by: Trang Booker on 3/3/2025 3:06 PM      Electronically signed by:  Mustapha Sims DO 3/5/2025 3:33 PM          
4 Eyes Skin Assessment     NAME:  Faisal Pathak  YOB: 1950  MEDICAL RECORD NUMBER:  [id number]    The patient is being assessed for  Admission    I agree that at least one RN has performed a thorough Head to Toe Skin Assessment on the patient. ALL assessment sites listed below have been assessed.      Areas assessed by both nurses:    Head, Face, Ears, Shoulders, Back, Chest, Arms, Elbows, Hands, Sacrum. Buttock, Coccyx, Ischium, Legs. Feet and Heels, and Under Medical Devices         Does the Patient have a Wound? Yes wound(s) were present on assessment. LDA wound assessment was Initiated and completed by RN  Rash to lower legs, 5cm blood blister intact to right heel ,  2 skin tear to left elbow 0.2cm circles, left armpit 2.3cm x 1.2cm, and skin tear right back 4.0 x 1.2cm. Bruise to right mid back below shoulder blade 2.0 x 1.2 cm, DTI to buttocks 10 x 13 cm, DTI right lower buttock cheek 2.8 x 2.0 cm, and DTI left lower buttock cheek 0.1 x 1.0 cm       David Prevention initiated by RN: Yes  Wound Care Orders initiated by RN: Yes    Pressure Injury (Stage 3,4, Unstageable, DTI, NWPT, and Complex wounds) if present, place Wound referral order by RN under : Yes    New Ostomies, if present place, Ostomy referral order under : No     Nurse 1 eSignature: Electronically signed by Padma Yanez RN on 2/27/25 at 5:22 PM EST    **SHARE this note so that the co-signing nurse can place an eSignature**    Nurse 2 eSignature: Electronically signed by Paula Rea RN on 2/27/25 at 6:28 PM EST   
ADDENDUM: Random level @ 1554 = 7.3 mcg/mL. Re-dose with vancomycin 1250 mg IV x 1    Sixto Arce PharmD, BCEMP 2/28/2025 9:00 PM   [phone]        Pharmacy Consultation Note  (Antibiotic Dosing and Monitoring)    Initial consult date: 2/26/25  Consulting physician/provider: JADE Hi  Drug: Vancomycin  Indication: Sepsis    Age/  Gender Height Weight IBW  Allergy Information   74 y.o./male 188 cm 106.6 kg (235 lb)     Ideal body weight: 82.2 kg (181 lb 3.5 oz)  Adjusted ideal body weight: 85.9 kg (189 lb 6.6 oz)   Patient has no known allergies.      Renal Function:  Recent Labs     02/27/25  2351 02/28/25  0541 02/28/25  0943   BUN 58* 50* 49*   CREATININE 1.4* 1.3* 1.3*       Intake/Output Summary (Last 24 hours) at 2/28/2025 1700  Last data filed at 2/28/2025 0913  Gross per 24 hour   Intake --   Output 1700 ml   Net -1700 ml       Vancomycin Monitoring:  Trough:  No results for input(s): \"VANCOTROUGH\" in the last 72 hours.  Random:    Recent Labs     02/27/25  1629   VANCORANDOM 8.4       Vancomycin Administration Times:  Recent vancomycin administrations                     vancomycin (VANCOCIN) 1,000 mg in sodium chloride 0.9 % 250 mL IVPB ([id number]) (mg) 1,000 mg New Bag 02/27/25 1757    vancomycin (VANCOCIN) 1,500 mg in sodium chloride 0.9 % 250 mL IVPB (mg) 1,500 mg New Bag 02/26/25 2206               Assessment:  Patient is a 74 y.o. male who has been initiated on vancomycin  Estimated Creatinine Clearance: 56 mL/min (A) (based on SCr of 1.3 mg/dL (H)).  To dose vancomycin, pharmacy will be utilizing dosing based off of levels because of patient's renal impairment/insufficiency  2/27: Random level @ 1629 = 8.4 mcg/mL    Plan:  Repeat random level today @ 1700  Will continue to monitor renal function   Pharmacy to follow      Sixto Arce PharmD, BCEMP 2/28/2025 5:01 PM   [phone]    SJW: [phone]  
Addendum:    Random level @ 1629 = 8.4 mcg/mL, re-dose with Vancomycin 1,000 mg IV x 1    Maryanne Anguiano PharmD, BCPS 2/27/2025 5:21 PM   [phone]     Pharmacy Consultation Note  (Antibiotic Dosing and Monitoring)    Initial consult date: 2/26/25  Consulting physician/provider: JADE Hi  Drug: Vancomycin  Indication: Sepsis    Age/  Gender Height Weight IBW  Allergy Information   74 y.o./male 188 cm 106.6 kg (235 lb)     Ideal body weight: 82.2 kg (181 lb 3.5 oz)  Adjusted ideal body weight: 92 kg (202 lb 11.7 oz)   Patient has no known allergies.      Renal Function:  Recent Labs     02/26/25  1538 02/27/25  0532   BUN 69* 66*   CREATININE 2.9* 2.0*       Intake/Output Summary (Last 24 hours) at 2/27/2025 1228  Last data filed at 2/27/2025 1046  Gross per 24 hour   Intake 1272.24 ml   Output 2450 ml   Net -1177.76 ml       Vancomycin Monitoring:  Trough:  No results for input(s): \"VANCOTROUGH\" in the last 72 hours.  Random:  No results for input(s): \"VANCORANDOM\" in the last 72 hours.    Vancomycin Administration Times:  Recent vancomycin administrations                     vancomycin (VANCOCIN) 1,500 mg in sodium chloride 0.9 % 250 mL IVPB (mg) 1,500 mg New Bag 02/26/25 2206                      Assessment:  Patient is a 74 y.o. male who has been initiated on vancomycin  Estimated Creatinine Clearance: 41 mL/min (A) (based on SCr of 2 mg/dL (H)).  To dose vancomycin, pharmacy will be utilizing dosing based off of levels because of patient's renal impairment/insufficiency  2/27: SCr 2.0 today    Plan:  Random level today @ 1600  Dose by levels  Will continue to monitor renal function   Pharmacy to follow      Maryanne Anguiano PharmD, BCPS 2/27/2025 12:28 PM   [phone]      W: [phone]    
Comprehensive Nutrition Assessment    Type and Reason for Visit:  Initial, Positive nutrition screen, Wound    Nutrition Recommendations/Plan:   Continue NPO.    Consult RD if needed.      Malnutrition Assessment:  Malnutrition Status:  Severe malnutrition (03/01/25 1413)    Context:  Chronic Illness     Findings of the 6 clinical characteristics of malnutrition:  Energy Intake:  75% or less estimated energy requirements for 1 month or longer  Weight Loss:  Greater than 10% over 6 months (14.4% wt loss in the last 9 months)     Body Fat Loss:  Unable to assess (d/t pt in droplet isolation per RSV)     Muscle Mass Loss:  Unable to assess (d/t pt in droplet isolation per RSV)    Fluid Accumulation:  Mild Extremities   Strength:  Not Performed    Nutrition Assessment:    Pt admit w/ hypernatremia. Found to have sepsis 2/2 UTI, ARF, RSV, AMS, multiple DTIs, severe oropharyngeal phase dysphagia. PMHx of MI, GERD, CAD, Afib on eliquis, HTN/HLD, Alzheimer's dementia, GIB, epistaxis, frequent UTIs. Noted loss of appetite PTA per chart. Pt currently NPO. Possible hospice placement noted. Will monitor nutrition progression & provide recs/orders for nutrient optimization & wound healing. Consult RD if needed. Follow up per policy.    Nutrition Related Findings:    A&Ox1, nonverbal, Abd WDL, I/Os -4.6L, BLE +1/+1 edema, Na 166, K 3.4, Cl 127, BUN 42, Bglu 116-171, elevated troponin, HDL 30, , , Alb 2.2, NC 3L/min, 14.4% wt loss in the last 9 months. SLP dx on 2/28/25 severe oropharyngeal phase dysphagia, high risk for aspiration. SLP rec on 3/1/25 recommend continue NPO status. Wound Type: Multiple, Deep Tissue Injury (Buttocks mid posterior DTI, Thigh posterior proximal Rt upper DTI, Thigh Lt posterior proximal upper DTI, Rt heel blood blister)       Current Nutrition Intake & Therapies:    Average Meal Intake: NPO  Average Supplements Intake: NPO  Diet NPO    Anthropometric Measures:  Height: 188 cm (6' 
I                  Nephrology Note  3/1/2025: Seen and examined.  No issues noted overnight.  No issues today.  Denies nausea vomiting or poor oral intake.  Unable to provide significant information.  Family at bedside.  Tentatively planned to go hospice on Monday      Vital SignsBP 115/82   Pulse 70   Temp 98.5 °F (36.9 °C) (Axillary)   Resp 22   Ht 1.88 m (6' 2\")   Wt 91.5 kg (201 lb 11.2 oz)   SpO2 96%   BMI 25.90 kg/m²   24HR INTAKE/OUTPUT:    Intake/Output Summary (Last 24 hours) at 3/1/2025 1715  Last data filed at 3/1/2025 0558  Gross per 24 hour   Intake --   Output 1200 ml   Net -1200 ml         PHYSICAL EXAM        Neck: No JVD  Lungs: Breath sounds decreased at the bases. No rales or ronchi.  Heart: Regular rate and rhythm. No S3 gallop. No  murmrur.  Abdomen: Soft non distended, non tender and normal bowel sounds.  Extremeties:   No edema.           Current Facility-Administered Medications   Medication Dose Route Frequency Provider Last Rate Last Admin    dextrose 5 % solution   IntraVENous Continuous Mustapha Gomez MD        vancomycin (VANCOCIN) 1,250 mg in sodium chloride 0.9 % 250 mL IVPB  1,250 mg IntraVENous Q18H Parul Hill APRN - CNP        potassium chloride 20 mEq in dextrose 5 % 1,000 mL infusion   IntraVENous Continuous Kj Carreon APRN -  mL/hr at 03/01/25 0904 New Bag at 03/01/25 0904    heparin (porcine) injection 5,000 Units  5,000 Units SubCUTAneous 3 times per day Parul Hill APRN - CNP   5,000 Units at 03/01/25 0656    cefTRIAXone (ROCEPHIN) 1,000 mg in sterile water 10 mL IV syringe  1,000 mg IntraVENous Q24H Parul Hill APRN - CNP   1,000 mg at 02/28/25 1720    pantoprazole (PROTONIX) tablet 40 mg  40 mg Oral QAM AC Parul Hill APRN - CNP        acetaminophen (TYLENOL) tablet 650 mg  650 mg Oral Q6H PRN Parul Hill APRN - CNP        prochlorperazine (COMPAZINE) injection 10 mg  10 mg IntraVENous Q6H PRN Parul Hill APRN - CNP        
Internal Medicine Consult Note    [facility]    Mustapha Sims D.O., JEOTonyI.                    Doug Kelelr D.O., JEOTonyITony Hemphill D.O.     Anabella Burgess, MSN, APRN, NP-C  Kevin Perez, MSN, APRN-CNP  Kj Carreon, MSN, APRN-CNP  Rosenda Ruiz, MSN APRN-CNP  Parul Hill, MSN. APRN-NP-C     Primary Care Physician: Joshua Vitale MD   Admitting Physician:  Mustapha Sims DO  Admission date and time: 2/26/2025  2:10 PM    Room:  [facility]/21-  Admitting diagnosis: Hypernatremia [E87.0]  Sepsis due to urinary tract infection (HCC) [A41.9, N39.0]  Urinary tract infection without hematuria, site unspecified [N39.0]  Altered mental status, unspecified altered mental status type [R41.82]  Sepsis with acute renal failure without septic shock, due to unspecified organism, unspecified acute renal failure type (HCC) [A41.9, R65.20, N17.9]    Patient Name: Faisal Pathak  MRN: [id number]    Date of Service: 3/1/2025     Subjective:  Faisal is a 74 y.o. male who was seen and examined today,3/1/2025, at the bedside.  Patient resting comfortably.  He is more alert today but remains nonverbal.  He is moving all extremities.  No family present during my examination.  There is no family present today.  Send sodium levels remain at 166.  Nephrology is following.    Review of System: Unable to assess due to decreased mental status    Physical Exam:  No intake/output data recorded.    Intake/Output Summary (Last 24 hours) at 3/1/2025 1711  Last data filed at 3/1/2025 0558  Gross per 24 hour   Intake --   Output 1200 ml   Net -1200 ml   I/O last 3 completed shifts:  In: -   Out: 2900 [Urine:2900]  Patient Vitals for the past 96 hrs (Last 3 readings):   Weight   02/28/25 0600 91.5 kg (201 lb 11.2 oz)   02/27/25 0357 106.6 kg (235 lb)   02/26/25 2017 106.6 kg (235 lb)     Vital Signs:   Blood pressure 115/82, pulse 70, temperature 98.5 °F (36.9 °C), 
Internal Medicine Consult Note    [facility]    Mustapha Sims D.O., JEOTonyI.                    Doug Keller D.O., CHRISTELLE Hemphill D.O.     Anabella Burgess, MSN, APRN, NP-C  Kevin Perez, MSN, APRN-CNP  Kj Carreon, MSN, APRN-CNP  Rosenda Ruiz, MSN APRN-CNP  Parul Hill, MSN. APRN-NP-C     Primary Care Physician: Joshua Vitale MD   Admitting Physician:  Mustapha Sims DO  Admission date and time: 2/26/2025  2:10 PM    Room:  [address] [place] [address]-  Admitting diagnosis: Hypernatremia [E87.0]  Sepsis due to urinary tract infection (HCC) [A41.9, N39.0]  Urinary tract infection without hematuria, site unspecified [N39.0]  Altered mental status, unspecified altered mental status type [R41.82]  Sepsis with acute renal failure without septic shock, due to unspecified organism, unspecified acute renal failure type (HCC) [A41.9, R65.20, N17.9]    Patient Name: Faisal Pathak  MRN: [id number]    Date of Service: 2/28/2025     Subjective:  Faisal is a 74 y.o. male who was seen and examined today,2/28/2025, at the bedside.  Patient resting comfortably.  His eyes are open but he remains nonverbal.  His daughter Bhavna is currently at the bedside.  Daughter states that he and her father has been having a steady decline and has not been eating very much at all.  Discussion was had regarding CODE STATUS along with possible PEG tube placement.  Daughter stated that he would not want a PEG tube and he does not want resuscitated.  Will consult palliative care for possible hospice care.  No family present during my examination.    Review of System: Unable to assess due to decreased mental status    Physical Exam:  I/O this shift:  In: -   Out: 350 [Urine:350]    Intake/Output Summary (Last 24 hours) at 2/28/2025 1729  Last data filed at 2/28/2025 0913  Gross per 24 hour   Intake --   Output 1700 ml   Net -1700 ml   I/O last 3 completed shifts:  In: 527.2 
Internal Medicine Consult Note    [facility]    Mustapha Sims D.O., JEOTonyI.                    Doug Keller D.O., JEOTonyITony Hemphill D.O.     Anabella Burgess, MSN, APRN, NP-C  Kevin Perez, MSN, APRN-CNP  Kj Carreon, MSN, APRN-CNP  Rosenda Ruiz, MSN APRN-CNP  Parul Hill, MSN. APRN-NP-C     Primary Care Physician: Joshua Vitale MD   Admitting Physician:  Mustapha Sims DO  Admission date and time: 2/26/2025  2:10 PM    Room:  [address] [place] [address]  Admitting diagnosis: Hypernatremia [E87.0]  Sepsis due to urinary tract infection (HCC) [A41.9, N39.0]  Urinary tract infection without hematuria, site unspecified [N39.0]  Altered mental status, unspecified altered mental status type [R41.82]  Sepsis with acute renal failure without septic shock, due to unspecified organism, unspecified acute renal failure type (HCC) [A41.9, R65.20, N17.9]    Patient Name: Faisal Pathak  MRN: [id number]    Date of Service: 2/27/2025     Subjective:  Faisal is a 74 y.o. male who was seen and examined today,2/27/2025, at the bedside.  Patient was seen and examined in the emergency department.  He is somewhat lethargic but will open his eyes to verbal stimuli.  He is nonverbal but does follow very simple directions.  Will continue antibiotic therapy, start IV fluids, and consult nephrology due to acute kidney injury.    No family present during my examination.    Review of System: Unable to assess due to decreased mental status    Physical Exam:  I/O this shift:  In: 527.2 [I.V.:527.2]  Out: 1500 [Urine:1500]    Intake/Output Summary (Last 24 hours) at 2/27/2025 1756  Last data filed at 2/27/2025 1440  Gross per 24 hour   Intake 527.24 ml   Output 3250 ml   Net -2722.76 ml   I/O last 3 completed shifts:  In: 1000 [IV Piggyback:1000]  Out: 1750 [Urine:1750]  Patient Vitals for the past 96 hrs (Last 3 readings):   Weight   02/27/25 0357 106.6 kg (235 lb)   02/26/25 
OCCUPATIONAL THERAPY INITIAL EVALUATION    [facility]  [address]  [place]. [place]        Date:2025                                                  Patient Name: Faisal Pathak    MRN: [id number]    : 1950    Room: [address] [place] [address]      Evaluating OT: Alok Rodriguez OTR/L; #[id number]     Referring Provider and Specific Provider Orders/Date:      25  OT eval and treat  Start:  25,   End:  25,   ONE TIME,   Standing Count:  1 Occurrences,   R         Parul Hill, APRN - CNP      Placement Recommendation: SNF       Diagnosis:   1. Sepsis with acute renal failure without septic shock, due to unspecified organism, unspecified acute renal failure type (HCC)    2. Altered mental status, unspecified altered mental status type    3. Hypernatremia    4. Urinary tract infection without hematuria, site unspecified         Surgery: None      Pertinent Medical History:       Past Medical History:   Diagnosis Date    Atrial fibrillation (HCC)     Bleeding tendency     Dementia (HCC)     Epistaxis 2024    Hand pain     Heart attack (HCC) 2004    History of blood transfusion     Hyperlipidemia     Numbness     Sick sinus syndrome (HCC)          Past Surgical History:   Procedure Laterality Date    CERVICAL FUSION      Dr. Joseph / [facility]     CERVICAL FUSION  2019    multilevel posterior @ [facility]    CORONARY ARTERY BYPASS GRAFT  2004    ENDOSCOPY, COLON, DIAGNOSTIC  2024    by Dr. Massey    PACEMAKER PLACEMENT      UPPER GASTROINTESTINAL ENDOSCOPY N/A 2024    ESOPHAGOGASTRODUODENOSCOPY performed by Tucker Sewell MD at [facility] ENDOSCOPY        Precautions:  Fall Risk, Activity as tolerated, droplet isolation RSV  , Hx of dementia       Assessment of current deficits:     [x] Functional mobility  [x]ADLs  [x] Strength               [x]Cognition    [x] Functional transfers   [x] IADLs         [x] 
Paged Dr. Hasy with sodium and chloride levels  
Patient to under hospice care.  Nothing to add in this unfortunate situation. Will sign off.   Please call back if needed.  
Pharmacy Consultation Note  (Antibiotic Dosing and Monitoring)    Initial consult date: 2/26/25  Consulting physician/provider: JADE Hi  Drug: Vancomycin  Indication: Sepsis    Age/  Gender Height Weight IBW  Allergy Information   74 y.o./male 188 cm       Patient weight not recorded   Patient has no known allergies.      Renal Function:  Recent Labs     02/26/25  1538   BUN 69*   CREATININE 2.9*       Intake/Output Summary (Last 24 hours) at 2/26/2025 2004  Last data filed at 2/26/2025 1835  Gross per 24 hour   Intake 1000 ml   Output 1750 ml   Net -750 ml       Vancomycin Monitoring:  Trough:  No results for input(s): \"VANCOTROUGH\" in the last 72 hours.  Random:  No results for input(s): \"VANCORANDOM\" in the last 72 hours.    Vancomycin Administration Times:  Recent vancomycin administrations        No vancomycin IV orders with administrations found.            Orders not given:            vancomycin (VANCOCIN) 1500 mg in sodium chloride 0.9% 250 mL IVPB    vancomycin (VANCOCIN) intermittent dosing (placeholder)                    Assessment:  Patient is a 74 y.o. male who has been initiated on vancomycin  CrCl cannot be calculated (Unknown ideal weight.).  To dose vancomycin, pharmacy will be utilizing dosing based off of levels because of patient's renal impairment/insufficiency    Plan:  Vancomycin 1,500 mg IV x 1  Will check vancomycin levels when appropriate  Dose by levels  Will continue to monitor renal function   Pharmacy to follow      Maryanne Anguiano, PharmD, BCPS 2/26/2025 8:04 PM   [phone]      [facility]: [phone]    
Pharmacy Consultation Note  (Antibiotic Dosing and Monitoring)    Initial consult date: 2/26/25  Consulting physician/provider: JADE Hi  Drug: Vancomycin  Indication: Sepsis    Age/  Gender Height Weight IBW  Allergy Information   74 y.o./male 188 cm 106.6 kg (235 lb)     Ideal body weight: 82.2 kg (181 lb 3.5 oz)  Adjusted ideal body weight: 85.9 kg (189 lb 6.6 oz)   Patient has no known allergies.      Renal Function:  Recent Labs     02/28/25  1554 02/28/25  2344 03/01/25  1146   BUN 47* 42* 39*   CREATININE 1.3* 1.2 1.1       Intake/Output Summary (Last 24 hours) at 3/1/2025 1423  Last data filed at 3/1/2025 0558  Gross per 24 hour   Intake --   Output 1200 ml   Net -1200 ml       Vancomycin Monitoring:  Trough:  No results for input(s): \"VANCOTROUGH\" in the last 72 hours.  Random:    Recent Labs     02/27/25  1629 02/28/25  1554   VANCORANDOM 8.4 7.3       Vancomycin Administration Times:  Recent vancomycin administrations                     vancomycin (VANCOCIN) 1,250 mg in sodium chloride 0.9 % 250 mL IVPB (mg) 1,250 mg New Bag 02/28/25 2159    vancomycin (VANCOCIN) 1,000 mg in sodium chloride 0.9 % 250 mL IVPB ([id number]) (mg) 1,000 mg New Bag 02/27/25 1757    vancomycin (VANCOCIN) 1,500 mg in sodium chloride 0.9 % 250 mL IVPB (mg) 1,500 mg New Bag 02/26/25 2206               Assessment:  Patient is a 74 y.o. male who has been initiated on vancomycin  Estimated Creatinine Clearance: 66 mL/min (based on SCr of 1.1 mg/dL).  To dose vancomycin, pharmacy will target an AUC of 400-600  2/27: Random level @ 1629 = 8.4 mcg/mL  2/28: Random level @ 1554 = 7.3 mcg/mL    Plan:  Trial vancomycin 1250 mg IV q18h  Will continue to monitor renal function   Pharmacy to follow      Daphnie HuffmanD, BCEMP 3/1/2025 2:23 PM   [phone]    W: [phone]  
Physician ambulance arrived and pt discharged to [facility]  
SPEECH LANGUAGE PATHOLOGY  DAILY PROGRESS NOTE        PATIENT NAME:  Faisal Pathak      :  1950          TODAY'S DATE:  2025 ROOM:  [address] [place] [address]    Current diet: Diet NPO    Pt seen for dysphagia tx and ongoing assessment.  Oral hygiene provided for mod amount crusty debris.  Sensory stim completed  x 10 with poor oral awareness noted, no attempt to swallow noted despite max multi-modality cues. Discussed with daughter Daphne, who reports patient had been eating well at SNF up through 25.  Continue NPO status @ this time, patient at high risk for aspiration due to poor swallow function. Continue per POC.    CPT code(s) 84818  dysphagia tx  Total minutes :  10 minutes      Ashanti Cooper MA, CCC-SLP  Speech-Language Pathologist  #[phone]   
SPEECH LANGUAGE PATHOLOGY  DAILY PROGRESS NOTE        PATIENT NAME:  Faisal Pathak      :  1950          TODAY'S DATE:  3/1/2025 ROOM:  [address] [place] [address]    Current diet: Diet NPO    Pt seen for dysphagia tx and ongoing assessment.  Patient awake, more alert but confused. Oral hygiene provided removing  mod amount of dried mucous/debris.  Sensory stim completed  x 15 with slightly increased oral awareness noted from previous date with additional attempts to swallow.  However, swallows were weak resulting in throat clearing.  Rec continue NPO status @ this time, patient at high risk for aspiration due to poor swallow function. Continue per POC.    CPT code(s) 90311  dysphagia tx  Total minutes :  15 minutes      CLAUDE Parsons, CCC-SLP/L   Speech Language Pathologist  [phone]    
Spiritual Health History and Assessment/Progress Note  [facility]    (P) Palliative Care,  ,  ,      Name: Faisal Pathak MRN: [id number]    Age: 74 y.o.     Sex: male   Language: English   Mormonism: Jain   Sepsis due to urinary tract infection (HCC)     Date: 3/3/2025                           Spiritual Assessment began in [facility] PIC/ICU        Referral/Consult From: (P) Palliative Care   Encounter Overview/Reason: (P) Palliative Care  Service Provided For: (P) Patient (daughter by phone)    Shantell, Belief, Meaning: Conversation was with his daughter Daphne by phone as currently non-verbal.  Patient identifies as spiritual  Family/Friends identify as spiritual      Importance and Influence:  Patient has spiritual/personal beliefs that influence decisions regarding their health  Family/Friends have spiritual/personal beliefs that influence decisions regarding the patient's health    Community:  Patient feels well-supported. Support system includes: Children  Family/Friends feel well-supported. Support system includes: Extended family    Assessment and Plan of Care:     Patient Interventions include: Other: Prayer said at bedside.  Family/Friends Interventions include: Facilitated expression of thoughts and feelings, Explored spiritual coping/struggle/distress, and Affirmed coping skills/support systems    Patient Plan of Care: Spiritual Care available upon further referral  Family/Friends Plan of Care: Spiritual Care available upon further referral    Electronically signed by THAIS Cochran on 3/3/2025 at 1:21 PM   
epic  Surrogate/Legal NOK:  Daphne Norton (daughter): [phone]  Adama Pathak (son): [phone] (cell), [phone] (home)     Spiritual assessment: no spiritual distress identified  Bereavement and grief: to be determined  Referrals to: none today  SUBJECTIVE:     Current medical issues leading to Palliative Medicine involvement include   Active Hospital Problems    Diagnosis Date Noted    Sepsis due to urinary tract infection (HCC) [A41.9, N39.0] 02/26/2025       Details of Conversation:    Chart reviewed. Patient was seen and examined at the bedside with no family present. He is in droplet isolation precautions for RSV.  He is lying in bed with eyes closed.  He awakens to verbal cues.  His speech is nonsensical and incomprehensible.    Call placed to patient's daughter, Daphne, to update and offer support. She states that she spoke with the physicians today and got an update on her dad's condition. She states that if they do not see an improvement over the weekend they will likely consider hospice on Monday. Much emotional support was offered via active listening and validation of feelings. I told her pall med would follow up on Monday.    Review of Systems   Unable to perform ROS: Dementia       OBJECTIVE:   Prognosis: Guarded    Physical Exam:  /68   Pulse 70   Temp 97.9 °F (36.6 °C) (Axillary)   Resp 18   Ht 1.88 m (6' 2\")   Wt 91.5 kg (201 lb 11.2 oz)   SpO2 98%   BMI 25.90 kg/m²   Physical Exam  Vitals reviewed.   Constitutional:       Appearance: He is underweight. He is ill-appearing.      Comments: Ill-appearing male lying in bed   HENT:      Head: Normocephalic.      Mouth/Throat:      Lips: Pink.      Mouth: Mucous membranes are dry.   Cardiovascular:      Rate and Rhythm: Normal rate and regular rhythm.   Pulmonary:      Effort: Pulmonary effort is normal. No accessory muscle usage or respiratory distress.   Skin:     General: Skin is warm and dry.   Neurological:      Mental Status: 
if needed.    Thank you,  CARLIE Mccain [facility] 3/2/77963:49 PM    
Date    CALCIUM 8.9 02/28/2025    CALCIUM 8.9 02/28/2025    CALCIUM 8.9 02/27/2025    PHOS 2.8 02/28/2025    PHOS 3.7 02/27/2025    PHOS 3.1 06/04/2024    MG 2.6 02/28/2025    MG 2.4 02/27/2025    MG 1.9 06/05/2024         BMP:   Recent Labs     02/26/25  1538 02/27/25  0532 02/27/25  2351 02/28/25  0541 02/28/25  0943   * 158* 167* 166* 167*   K 3.8 3.7 3.4* 3.4* 3.3*   * 117* 126* 127* 127*   CO2 29 30* 32* 29 33*   BUN 69* 66* 58* 50* 49*   CREATININE 2.9* 2.0* 1.4* 1.3* 1.3*       Serum Osmolality:   No results for input(s): \"OSMOS\" in the last 72 hours.      Urine Chemisrty:    No results for input(s): \"CLUR\", \"NAUR\", \"KUR\", \"LABCREAU\", \"UREAUR\", \"OSMOU\" in the last 72 hours.                                               No results for input(s): \"MALBCR\", \"LABPROT\" in the last 72 hours.           Assessment: / Plan:      Acute kidney injury.  Acute kidney injury most likely secondary to profound volume depletion.  Renal function has improved with hydration and the patient is also hypernatremic which again points to volume depletion is the most likely etiology behind the patient's acute kidney injury.  Will continue hydration.  Renal function has improved with serum creatinine steadily trending downwards.  Hold diuretics.    Later on if needed we will check an imaging study to rule out any obstruction.     Hypernatremia.  Patient with profound hypernatremia reflecting intravascular volume depletion.  Patient may have had poor access to free water due to mental status changes.  No evident history of diarrhea.  Will support the patient with isotonic fluids followed by hypotonic fluids.  Patient's initial free water deficit is 6.9 L based on weight of 106.6 kg.  Will continue hypotonic fluids with D5W.  Patient has already been hydrated with isotonic fluids prior to this.    Hypokalemia.  Hypokalemia of undetermined etiology.  This may reflect more aggressive hydration.  Will supplement potassium.  
List   Diagnosis    GI bleed    Severe epistaxis    Sepsis due to urinary tract infection (HCC)         CPT code:  28441  bedside swallow martha Cooper MA, CCC-SLP  Speech-Language Pathologist  #[id number]     
with a vitamin D level of 9.7 ng/ml.  Will supplement when able to take orally.      Anemia of unknown etiology.  Hemoglobin is lower and may reflect hydration.  Volume depletion may have masked anemia earlier.    Follow hemoglobin and hematocrit.       Dehydration.  Initially received isotonic solution followed by hypertonic.  Continue D5W at 150 mL an hour  Mustapha Gomez MD    
AM  3/4/2025        
CNP  3/2/2025  2:58 PM        I saw and evaluated the patient. I agree with the findings and the plan of care as documented in Kj HANSEN-CNP note.    Mustapha Sims DO, FACOI  3:01 PM  3/2/2025        
aeruginosa Not Detected     Proteus spp Not Detected     Salmonella species Not Detected     SERRATIA MARCESCENS Not Detected     Stenotrophomonas maltophilia Not Detected     Haemophilus influenzae Not Detected     Listeria monocytogenes Not Detected     Bacteroides fragilis Not Detected     Neisseria Meningitidis, NMNI Not Detected     Staphylococcus spp Not Detected     Staphylococcus Aureus Not Detected     Staphylococcus epidermidis Not Detected     Staphylococcus lugdunensis Not Detected     Streptococcus spp Not Detected     Enterococcus faecalis DETECTED     Enterococcus faecium Not Detected     Streptococcus agalactiae (Group B) Not Detected     Streptococcus pneumoniae Not Detected     Streptococcus pyogenes Not Detected     Vancomycin Resistant by PCR Not Detected    Susceptibility        Enterococcus faecalis      BACTERIAL SUSCEPTIBILITY PANEL SHANITA      ampicillin <=2  Sensitive      Gentamicin, High Level  Sensitive      linezolid 2  Sensitive      Streptomycin, Hi Level  Sensitive      vancomycin 1  Sensitive                           Culture, Blood 2 [[id number]] Collected: 02/26/25 1538    Order Status: Completed Specimen: Blood Updated: 03/02/25 1820     Specimen Description .BLOOD     Special Requests          Culture NO GROWTH 4 DAYS    Respiratory Panel, Molecular, with COVID-19 (Restricted: peds pts or suitable admitted adults) [[id number]]  (Abnormal) Collected: 02/26/25 1538    Order Status: Completed Specimen: Nasopharyngeal Swab Updated: 02/26/25 2322     Specimen Description .NASOPHARYNGEAL SWAB     Adenovirus PCR Not Detected     Coronavirus 229E PCR Not Detected     Coronavirus HKU1 PCR Not Detected     Coronavirus NL63 PCR Not Detected     Coronavirus OC43 PCR Not Detected     SARS-CoV-2, PCR Not Detected     Human Metapneumovirus PCR Not Detected     Rhino/Enterovirus PCR Not Detected     Influenza A by PCR Not Detected     Influenza B by PCR Not Detected     Parainfluenza 1 PCR Not 
and tubes intact, nursing notified.      Patient would benefit from continued skilled Physical Therapy to improve functional independence and quality of life.         Patient's/ family goals   none stated    Time in  0925  Time out  0953    Total Treatment Time  8 minutes    Evaluation time includes thorough review of current medical information, gathering information on past medical history/social history and prior level of function, completion of standardized testing/informal observation of tasks, assessment of data, and development of Plan of care and goals.     CPT codes:  Low Complexity PT evaluation (80190)  Therapeutic exercises (28846)   8 minutes  1 unit(s)    Matthew Bates, PT

## 2025-03-07 LAB
MICROORGANISM SPEC CULT: NORMAL
MICROORGANISM SPEC CULT: NORMAL
SERVICE CMNT-IMP: NORMAL
SERVICE CMNT-IMP: NORMAL
SPECIMEN DESCRIPTION: NORMAL
SPECIMEN DESCRIPTION: NORMAL

## 2025-03-29 ASSESSMENT — VISUAL ACUITY: OU: 1

## 2025-03-29 NOTE — PROGRESS NOTES
Visit Date: 3/5/25  Faisal Pathak  1950  male 74 y.o.      Subjective:    CC: Patient presents with sepsis and dementia. Patient presents for follow up of afib, htn, and hyperlipidemia.    HPI:  Memory Loss    Patient reports onset of memory loss was more than 1 year ago. Onset quality is gradual (severe).    Behavorial problems for memory loss include hallucinations, delusions and agitation.     Family and/or patient concerns for memory loss include wandering, driving, cooking and unable to manage home environment. Patient lives in a/an skilled nursing facility.  Urinary Tract Infection  Chronicity: sepsis secondary to uti. The current episode started in the past 7 days. The problem has been gradually worsening since onset.   Atrial Fibrillation  Presents for follow-up visit. The symptoms have been stable. Past medical history includes hyperlipidemia.   Hypertension  This is a chronic problem. The current episode started more than 1 year ago. The problem is unchanged. The problem is controlled. There are no associated agents to hypertension. Risk factors for coronary artery disease include dyslipidemia and male gender.   Hyperlipidemia  This is a chronic problem. The current episode started more than 1 year ago. The problem is controlled. Recent lipid tests were reviewed and are variable.       ROS:  Review of Systems   Unable to perform ROS: Dementia        Current Meds: Refer to nursing home record    PMH:    Medical Problems: reviewed and updated       Diagnosis Date    Atrial fibrillation (HCC)     Bleeding tendency     Dementia (HCC)     Epistaxis 05/30/2024    Hand pain     Heart attack (HCC) 2004    History of blood transfusion     Hyperlipidemia     Numbness     Sick sinus syndrome (HCC)         Surgical Hx: reviewed and updated   Past Surgical History:   Procedure Laterality Date    CERVICAL FUSION  2003    Dr. Joseph / [facility]     CERVICAL FUSION  09/2019    multilevel posterior @ [facility]

## (undated) DEVICE — KIT BEDSIDE REVITAL OX 500ML

## (undated) DEVICE — FORCEPS BX L240CM JAW DIA2.8MM L CAP W/ NDL MIC MESH TOOTH

## (undated) DEVICE — BAG SPECIMEN BIOHAZARD 6IN X 9IN

## (undated) DEVICE — SPONGE GZ 4IN 4IN 4 PLY N WVN AVANT

## (undated) DEVICE — COVER,LIGHT HANDLE,FLX,1/PK: Brand: MEDLINE INDUSTRIES, INC.

## (undated) DEVICE — YANKAUER,BULB TIP,W/O VENT,RIGID,STERILE: Brand: MEDLINE

## (undated) DEVICE — AIR/WATER CLEANING ADAPTER FOR OLYMPUS® GI ENDOSCOPE: Brand: BULLDOG®

## (undated) DEVICE — GOWN ISOLATN XL BLU POLYPR OVR HD OPN BK KNIT CUF PROTCT

## (undated) DEVICE — KENDALL 450 SERIES MONITORING FOAM ELECTRODE - RECTANGULAR SHAPE ( 3/PK): Brand: KENDALL

## (undated) DEVICE — JELLY,LUBE,STERILE,FLIP TOP,TUBE,4-OZ: Brand: MEDLINE

## (undated) DEVICE — TOWEL,OR,DSP,ST,BLUE,STD,6/PK,12PK/CS: Brand: MEDLINE

## (undated) DEVICE — Device: Brand: DEFENDO VALVE AND CONNECTOR KIT

## (undated) DEVICE — BLOCK BITE 60FR CAREGUARD

## (undated) DEVICE — CONTAINER SPEC COLL 960ML POLYPR TRIANG GRAD INTAKE/OUTPUT

## (undated) DEVICE — WIPES SKIN CLOTH READYPREP 9 X 10.5 IN 2% CHLORHEX GLUCONATE CHG PREOP

## (undated) DEVICE — 4-PORT MANIFOLD: Brand: NEPTUNE 2

## (undated) DEVICE — TUBING, SUCTION, 1/4" X 10', STRAIGHT: Brand: MEDLINE

## (undated) DEVICE — MASK,FACE,MAXFLUIDPROTECT,SHIELD/ERLPS: Brand: MEDLINE